# Patient Record
Sex: FEMALE | Race: WHITE | NOT HISPANIC OR LATINO | Employment: OTHER | URBAN - METROPOLITAN AREA
[De-identification: names, ages, dates, MRNs, and addresses within clinical notes are randomized per-mention and may not be internally consistent; named-entity substitution may affect disease eponyms.]

---

## 2017-08-07 ENCOUNTER — CONSULT (OUTPATIENT)
Dept: ONCOLOGY | Facility: CLINIC | Age: 67
End: 2017-08-07

## 2017-08-07 VITALS
BODY MASS INDEX: 29.64 KG/M2 | SYSTOLIC BLOOD PRESSURE: 134 MMHG | DIASTOLIC BLOOD PRESSURE: 73 MMHG | TEMPERATURE: 97.6 F | HEIGHT: 61 IN | WEIGHT: 157 LBS | HEART RATE: 65 BPM | RESPIRATION RATE: 20 BRPM

## 2017-08-07 DIAGNOSIS — R94.31 ABNORMAL ELECTROCARDIOGRAM: ICD-10-CM

## 2017-08-07 DIAGNOSIS — C50.911 MALIGNANT NEOPLASM OF RIGHT FEMALE BREAST, UNSPECIFIED SITE OF BREAST: Primary | ICD-10-CM

## 2017-08-07 PROCEDURE — 99205 OFFICE O/P NEW HI 60 MIN: CPT | Performed by: INTERNAL MEDICINE

## 2017-08-07 RX ORDER — IBUPROFEN 600 MG/1
600 TABLET ORAL EVERY 8 HOURS PRN
COMMUNITY
Start: 2017-06-12 | End: 2017-10-25 | Stop reason: HOSPADM

## 2017-08-07 RX ORDER — ATORVASTATIN CALCIUM 40 MG/1
1 TABLET, FILM COATED ORAL DAILY
COMMUNITY
Start: 2017-07-20 | End: 2017-08-25 | Stop reason: SDUPTHER

## 2017-08-07 RX ORDER — GABAPENTIN 300 MG/1
1 CAPSULE ORAL 2 TIMES DAILY
COMMUNITY
Start: 2017-06-12 | End: 2017-10-20

## 2017-08-07 RX ORDER — AMLODIPINE BESYLATE 5 MG/1
2.5 TABLET ORAL NIGHTLY
COMMUNITY
Start: 2017-06-12 | End: 2018-06-08

## 2017-08-07 RX ORDER — ASPIRIN 81 MG
1 TABLET, DELAYED RELEASE (ENTERIC COATED) ORAL DAILY
COMMUNITY
Start: 2017-07-20 | End: 2017-08-25

## 2017-08-07 NOTE — PROGRESS NOTES
DATE OF CONSULTATION: 8/7/2017    REFERRING PHYSICIAN: Ayad Brandon MD    Dear Dr. Ayad Brandon MD  Thank you for asking for my medical advice on this patient. I saw her in the  [place] office on 8/7/2017    REASON FOR CONSULTATION: High-grade triple negative right breast cancer stage IA [id number]     HISTORY OF PRESENT ILLNESS: The patient is a very pleasant 67 y.o.  female   who was in her usual state of health until approximately 7 months. The patient presented with right breast lump.  She has been very busy taking care of her sick  who passed away May 2017.  She had bilateral mammogram May 12, 2017 that revealed 1.8 cm abnormality in the right breast at 1 o'clock position.  Ultrasound-guided biopsy done on July 5, 2017 showed invasive triple negative high grade ductal carcinoma.  The patient was referred to me for further recommendations.    SUBJECTIVE: When I saw the patient today she is doing fairly well.  She is been through a lot of stress.  She lost 10 pounds over the last month.  Never had this problem before.  Denied any breast tenderness no nipple discharge.    The patient has been pregnant twice she status post 2 C-sections.  She had hysterectomy with bilateral oophorectomy for uterine fibroid.    Review of Systems   Constitutional: Positive for appetite change and unexpected weight change. Negative for activity change, chills, fatigue and fever.   HENT: Negative for hearing loss, mouth sores, nosebleeds, sore throat and trouble swallowing.    Eyes: Negative for visual disturbance.   Respiratory: Negative for cough, chest tightness, shortness of breath and wheezing.    Cardiovascular: Negative for chest pain, palpitations and leg swelling.   Gastrointestinal: Negative for abdominal distention, abdominal pain, blood in stool, constipation, diarrhea, nausea, rectal pain and vomiting.   Endocrine: Negative for cold intolerance and heat intolerance.   Genitourinary: Negative for  "difficulty urinating, dysuria, frequency and urgency.   Musculoskeletal: Negative for arthralgias, back pain, gait problem, joint swelling and myalgias.   Skin: Negative for rash.   Neurological: Negative for dizziness, tremors, syncope, weakness, light-headedness, numbness and headaches.   Hematological: Negative for adenopathy. Does not bruise/bleed easily.   Psychiatric/Behavioral: Negative for confusion, sleep disturbance and suicidal ideas. The patient is nervous/anxious.        Past Medical History:   Diagnosis Date   • Asthma    • Breast cancer    • COPD (chronic obstructive pulmonary disease)    • Diabetes mellitus    • History of angina    • Hypertension        Social History     Social History   • Marital status:      Spouse name: N/A   • Number of children: N/A   • Years of education: N/A     Occupational History   • Not on file.     Social History Main Topics   • Smoking status: Never Smoker   • Smokeless tobacco: Never Used   • Alcohol use No   • Drug use: No   • Sexual activity: Defer     Other Topics Concern   • Not on file     Social History Narrative   • No narrative on file       Family History   Problem Relation Age of Onset   • Rheum arthritis Mother    • No Known Problems Father        Past Surgical History:   Procedure Laterality Date   • COLONOSCOPY  2015   • HYSTERECTOMY         No Known Allergies       Current Outpatient Prescriptions:   •  amLODIPine (NORVASC) 5 MG tablet, Take 1 tablet by mouth Every Night., Disp: , Rfl:   •  ASPIRIN LOW DOSE 81 MG EC tablet, Take 1 tablet by mouth Daily., Disp: , Rfl:   •  atorvastatin (LIPITOR) 40 MG tablet, Take 1 tablet by mouth Daily., Disp: , Rfl:   •  gabapentin (NEURONTIN) 300 MG capsule, Take 1 capsule by mouth Daily. 1-2 tab, Disp: , Rfl:   •  ibuprofen (ADVIL,MOTRIN) 600 MG tablet, As Needed., Disp: , Rfl:     PHYSICAL EXAMINATION:   /73 Comment: LUE  Pulse 65  Temp 97.6 °F (36.4 °C) (Temporal Artery )   Resp 20  Ht 60.5\" (153.7 " cm)  Wt 157 lb (71.2 kg)  BMI 30.16 kg/m2   ECOG Performance Status: 1 - Symptomatic but completely ambulatory  General Appearance:  alert, cooperative, no apparent distress and appears stated age   Neurologic/Psychiatric: A&O x 3, gait steady, appropriate affect, strength 5/5 in all muscle groups   HEENT:  Normocephalic, without obvious abnormality, mucous membranes moist   Neck: Supple, symmetrical, trachea midline, no adenopathy;  No thyromegaly, masses, or tenderness   Lungs:   Clear to auscultation bilaterally; respirations regular, even, and unlabored bilaterally   Heart:  Regular rate and rhythm, no murmurs appreciated   Abdomen:   Soft, non-tender, non-distended and no organomegaly   Lymph nodes: No cervical, supraclavicular, inguinal or axillary adenopathy noted   Extremities: Normal, atraumatic; no clubbing, cyanosis, or edema    Skin: No rashes, ulcers, or suspicious lesions noted       No visits with results within 2 Week(s) from this visit.  Latest known visit with results is:    Hospital Outpatient Visit on 10/06/2015   Component Date Value Ref Range Status   • Ferritin 10/06/2015 161.00  11.1 - 264 ng/mL Final        No results found.      DIAGNOSTIC DATA:   1. Radiology: May 12, 2017 positive mammogram abnormality right breast 1 o'clock position 1.8 cm in size.   2. Dr. Kowalski's note from July 12, 2017 reviewed by me and documented in the  chart.   3. Pathology report: Right breast biopsy July 5, 2017 triple negative high-grade ductal carcinoma  4. Laboratory data: Blood work done October 6, 2015 showed serum ferritin 161     ASSESSMENT: The patient is a very pleasant 67 y.o.  female  with stage I a triple negative right breast cancer    PROBLEM LIST:   1.  Triple negative right breast cancer [id number] stage IA:  A.  Presented with self felt lump right breast  B.  Mammogram done May 23, 2017 showed 1.8 cm nodule 1:00 right breast  C.  Ultrasound-guided biopsy done on July 5, 2017 revealed triple  negative high-grade ductal carcinoma  2.  Hypertension  3.  Hypercholesteremia  4.  Neuropathy    PLAN:   1. I had a long discussion today with the patient and her daughter about her  new diagnosis of breast cancer. I did go over the final pathology report in  detail with both of them. I reviewed the patient's documents including refereing provider's notes, lab results, imaging, and path report.   2.  The patient is scheduled to meet with Dr. LIU later on today to have further discussion about surgical options.  I did recommend lumpectomy with sentinel lymph node biopsy.  I explained to the patient that lumpectomy followed by adjuvant radiation would be as good as mastectomy from cancer recurrence perspective.  3.  I will ask Dr. LIU to insert Port-A-Cath since patient would benefit from adjuvant chemotherapy given her tumor size more than 1 cm with triple negative disease.  I have called Dr. LIU to coordinate patient's care.  4.  I will set the patient up to meet with our Gene counselor since she has high grade triple negative breast cancer.  Patient might benefit from molecular testing.  She does have 2 healthy daughters.  5.  The patient will follow-up with me in 3 weeks to go over the final pathology report as well as arrange for adjuvant chemotherapy, patient will be treated with TC versus TAC versus AC followed by T.  Patient will need to have cardiac echo prior to starting treatment.  America Grissom MD  8/7/2017

## 2017-08-08 ENCOUNTER — DOCUMENTATION (OUTPATIENT)
Dept: OTHER | Facility: HOSPITAL | Age: 67
End: 2017-08-08

## 2017-08-08 NOTE — PROGRESS NOTES
Saw patient with Dr. LIU and patients daughter. Dr. LIU reviewed the pathology report and surgical options. Stage IA palpable mass, IDC of the right breast triple negative- the patient has been seen by Dr. Grissom and will be getting chemptherapy after surgery - MRI has been ordered - patient is hoping for BCT with Radiation -Patient agreed to be consented for the GRAIL study. Arm measurement completed and given to [employer] for EMR. Patient instructed to stop 81 mg of daily ASA in preparation for surgery - Patient also is to establish care with PCP - daughter states that she will take care of that. The patient lives in [place] but will be here for treatment and will be moving here as her  just passed away Spring of 2017. Educational and supportive materials given and reviewed.

## 2017-08-11 ENCOUNTER — CLINICAL SUPPORT (OUTPATIENT)
Dept: GENETICS | Facility: HOSPITAL | Age: 67
End: 2017-08-11

## 2017-08-11 DIAGNOSIS — Z80.1 FAMILY HISTORY OF LUNG CANCER: ICD-10-CM

## 2017-08-11 DIAGNOSIS — C50.911 MALIGNANT NEOPLASM OF RIGHT FEMALE BREAST, UNSPECIFIED SITE OF BREAST: Primary | ICD-10-CM

## 2017-08-14 NOTE — PROGRESS NOTES
Keiry Roman, a 67-year-old female, was referred for genetic counseling due to a personal history of triple negative breast cancer. She was recently diagnosed with triple negative breast cancer at age 67. She had her uterus and ovaries removed in her 40’s. She was seen for genetic counseling on 8/11/17 and was accompanied to the appointment by her daughter, Laine. Ms. Roman was interested in discussing her risk for a hereditary cancer syndrome.    PERTINENT FAMILY HISTORY: (See attached pedigree)   Brother:  Lung cancer, 50s    We do not have medical records to confirm these diagnoses.          RISK ASSESSMENT:  Ms. Roman’s family history is not particularly suspicious for a hereditary cancer syndrome. The cancer diagnoses in the family have been later onset (diagnosed over 50 years of age) greatly reducing the likelihood of a hereditary cancer syndrome. Additionally, there is no reported family history of breast or ovarian cancer.  Based on the family history information available at this time, Ms. Roman did not meet NCCN guidelines criteria for testing for a hereditary cancer syndrome, which indicates testing would not be covered by insurance.  This assessment is based on the information available at the time of the visit, and could change should new information become available.      GENETIC COUNSELING (30 minutes):  We reviewed the family history information in detail.  Cancer is very common; approximately 1 in 3 individuals will develop cancer within a lifetime.  It is not uncommon to see multiple individuals within a family with cancer given the high chance for a person to develop cancer.  The interaction of many factors determines each person’s personal risk, including age, family or personal history of cancer, and external factors such as diet and environmental exposures.  Exactly how these various risk factors interact in an individual is unclear.  Most often, we believe cancer to be a  multifactorial disease caused by an accumulation of genetic alterations acquired over our lifetime, with environmental factors acting in the development of a malignancy.    Cases of breast cancer follow three general patterns: sporadic, familial, and hereditary.  While most types of breast cancer are sporadic, some cases appear to occur in family clusters.  Familial cases may be due to a combination of shared genes and environmental factors among family members.  In even fewer families, the breast cancer is said to be inherited, and the genes responsible for the cancer are known.      The pedigree patterns observed in sporadic versus hereditary cancer families were reviewed.  Family histories typical of hereditary cancer syndromes usually include multiple first- and second-degree relatives diagnosed with cancer types that define a syndrome.  These cases tend to be diagnosed at younger-than-expected ages and can be bilateral or multifocal.  The cancer in these families follows an autosomal dominant inheritance pattern, which indicates the likely presence of a mutation in a cancer susceptibility gene.  Children and siblings of an individual believed to carry this mutation have a 50% chance of inheriting that mutation, thereby inheriting the increased risk to develop cancer.  The National Comprehensive Cancer Network has established guidelines for testing BRCA1/2 and other hereditary cancer syndromes, outlining the criteria for considering genetic testing. An individual diagnosed with triple negative breast cancer must be diagnosed at age 60 or younger to meet NCCN guidelines criteria for genetic testing, in the absence of significant family history. Ms. Roman’s family does not meet criteria for genetic testing, and is at a low risk for a hereditary cancer syndrome based on the information available.     CLINICAL MANAGEMENT GUIDELINES: Ms. Roman’s management and surveillance should be determined by her oncology  team. Based on her family history, her risk for other cancers is not expected to be significantly greater than the general population risk.    Ms. Roman’s close female relatives likely have a somewhat increased lifetime risk for breast cancer based on family history. Given the possible increased risk, options available to individuals with a high lifetime risk for breast cancer were briefly discussed.  Relatives could have individual risk assessments performed to determine their breast cancer risk.  We discussed the specific risk assessment for her daughter who was present at the appointment with Ms. Roman.     Surveillance for individuals with a high lifetime risk of breast cancer (>20% lifetime risk), based on NCCN guidelines, would consist of semi-annual clinical breast exams and monthly self-breast exams starting by age 18 and annual mammography starting 10 years younger than the earliest diagnosis in the family or age 40.  According to an American Cancer Society expert panel and NCCN guidelines, annual breast MRI should be offered to women whose lifetime risk of breast cancer is 20-25 percent or more.  Relatives may have a personalized risk assessment performed to quantify their breast cancer risk.  Breast cancer chemoprevention is another option that female relatives may wish to consider.  Studies have shown that Tamoxifen and Raloxifene can cut the risk of estrogen receptor positive breast cancer by 50% when taken by high-risk women over a 5-year period (not recommended before age 35).  There are risks and side effects associated with these medications; therefore the risks versus benefits must be considered prior to deciding to take chemopreventative medications.  Recommendations could change should new information become available regarding the family history.     PLAN:  Genetic counseling remains available to Ms. Roman.  If information changes regarding Ms. Roman’s personal or family history, we  would be happy to reassess Ms. Roman’s risk or reevaluate the family’s risk for a hereditary cancer syndrome.  Ms. Roman is welcome to contact us with any questions or concerns at [phone].      Bettie Birch MS  Genetic Counselor    Cc:  MD Keiry Loya

## 2017-08-18 ENCOUNTER — HOSPITAL ENCOUNTER (OUTPATIENT)
Dept: MRI IMAGING | Facility: HOSPITAL | Age: 67
Discharge: HOME | End: 2017-08-18
Attending: SURGERY | Admitting: SURGERY

## 2017-08-18 DIAGNOSIS — C50.211 BILATERAL MALIGNANT NEOPLASM OF UPPER INNER QUADRANT OF BREAST IN FEMALE (HCC): ICD-10-CM

## 2017-08-18 DIAGNOSIS — C50.212 BILATERAL MALIGNANT NEOPLASM OF UPPER INNER QUADRANT OF BREAST IN FEMALE (HCC): ICD-10-CM

## 2017-08-18 PROCEDURE — A9577 INJ MULTIHANCE: HCPCS | Performed by: SURGERY

## 2017-08-18 PROCEDURE — 77059 MRI BREAST BILATERAL W WO CONTRAST: CPT | Performed by: RADIOLOGY

## 2017-08-18 PROCEDURE — 0159T PR BREAST MRI, COMPUTER AIDED DETECTION: CPT | Performed by: RADIOLOGY

## 2017-08-18 PROCEDURE — C8908 MRI W/O FOL W/CONT, BREAST,: HCPCS

## 2017-08-18 PROCEDURE — 0159T HC MRI BREAST COMPUTER ANALYSIS: CPT

## 2017-08-18 PROCEDURE — 0 GADOBENATE DIMEGLUMINE 529 MG/ML SOLUTION: Performed by: SURGERY

## 2017-08-18 RX ADMIN — GADOBENATE DIMEGLUMINE 15 ML: 529 INJECTION, SOLUTION INTRAVENOUS at 15:30

## 2017-08-21 ENCOUNTER — TELEPHONE (OUTPATIENT)
Dept: ONCOLOGY | Facility: CLINIC | Age: 67
End: 2017-08-21

## 2017-08-21 NOTE — TELEPHONE ENCOUNTER
----- Message from Lisa Gan sent at 8/21/2017 11:25 AM EDT -----  Regarding: BADIN/QUESTION ABOUT ECHO  Patients daughter Laine called and asked that someone call her back because she has some questions about her moms echo on 08/25/17. Laine can be reached at [phone]

## 2017-08-21 NOTE — TELEPHONE ENCOUNTER
Per Dr Grissom, previous ECHO that had been done is sufficient for treatment. Cancelled ECHO scheduled for 8/25/17

## 2017-08-22 ENCOUNTER — TELEPHONE (OUTPATIENT)
Dept: MRI IMAGING | Facility: HOSPITAL | Age: 67
End: 2017-08-22

## 2017-08-22 ENCOUNTER — TRANSCRIBE ORDERS (OUTPATIENT)
Dept: MAMMOGRAPHY | Facility: HOSPITAL | Age: 67
End: 2017-08-22

## 2017-08-22 DIAGNOSIS — C50.211 MALIGNANT NEOPLASM OF UPPER-INNER QUADRANT OF RIGHT FEMALE BREAST (HCC): Primary | ICD-10-CM

## 2017-08-22 NOTE — TELEPHONE ENCOUNTER
Called pt and talked with pt's daughter, due to the language barrier. Daughter verbalized understanding of results and will convey them to her mother. Surgical consult was recommended.

## 2017-08-25 ENCOUNTER — APPOINTMENT (OUTPATIENT)
Dept: CARDIOLOGY | Facility: HOSPITAL | Age: 67
End: 2017-08-25
Attending: INTERNAL MEDICINE

## 2017-08-25 ENCOUNTER — OFFICE VISIT (OUTPATIENT)
Dept: INTERNAL MEDICINE | Facility: CLINIC | Age: 67
End: 2017-08-25

## 2017-08-25 VITALS
HEART RATE: 63 BPM | DIASTOLIC BLOOD PRESSURE: 82 MMHG | OXYGEN SATURATION: 98 % | WEIGHT: 157 LBS | TEMPERATURE: 97.6 F | HEIGHT: 61 IN | SYSTOLIC BLOOD PRESSURE: 129 MMHG | BODY MASS INDEX: 29.64 KG/M2

## 2017-08-25 DIAGNOSIS — I10 HTN (HYPERTENSION), BENIGN: Primary | ICD-10-CM

## 2017-08-25 DIAGNOSIS — E78.2 MIXED HYPERLIPIDEMIA: ICD-10-CM

## 2017-08-25 DIAGNOSIS — C50.211 MALIGNANT NEOPLASM OF UPPER-INNER QUADRANT OF RIGHT FEMALE BREAST (HCC): ICD-10-CM

## 2017-08-25 DIAGNOSIS — R73.9 HYPERGLYCEMIA: ICD-10-CM

## 2017-08-25 PROCEDURE — 90670 PCV13 VACCINE IM: CPT | Performed by: INTERNAL MEDICINE

## 2017-08-25 PROCEDURE — G0009 ADMIN PNEUMOCOCCAL VACCINE: HCPCS | Performed by: INTERNAL MEDICINE

## 2017-08-25 PROCEDURE — 99203 OFFICE O/P NEW LOW 30 MIN: CPT | Performed by: INTERNAL MEDICINE

## 2017-08-25 RX ORDER — ATORVASTATIN CALCIUM 40 MG/1
40 TABLET, FILM COATED ORAL DAILY
Qty: 30 TABLET | Refills: 11 | Status: SHIPPED | OUTPATIENT
Start: 2017-08-25

## 2017-08-25 RX ORDER — PROMETHAZINE HYDROCHLORIDE AND CODEINE PHOSPHATE 6.25; 1 MG/5ML; MG/5ML
5 SYRUP ORAL EVERY 6 HOURS PRN
Qty: 118 ML | Refills: 0 | Status: SHIPPED | OUTPATIENT
Start: 2017-08-25 | End: 2017-10-04

## 2017-08-25 NOTE — PROGRESS NOTES
Subjective  Keiry Roman is a 67 y.o. female    HPI coming in to establish care here she is accompanied by her daughter was giving us some of the history patient with a history of hypertension and hyperlipidemia with a recent diagnosis of right breast cancer which appears to be localized. Patient is due for possible lumpectomy followed by chemotherapy.. Currently complains of some fatigue with dyspnea on exertion. She denies chest pain or palpitation. Was a resident in [place] some of the workup done in the past will be brought in by her daughter    The following portions of the patient's history were reviewed and updated as appropriate: allergies, current medications, past family history, past medical history, past social history, past surgical history, and problem list.     Review of Systems   Constitutional: Positive for fatigue. Negative for activity change, appetite change and fever.   HENT: Positive for congestion, rhinorrhea, sinus pressure and sneezing. Negative for ear discharge, ear pain and trouble swallowing.    Eyes: Negative for photophobia and visual disturbance.   Respiratory: Positive for shortness of breath. Negative for cough.    Cardiovascular: Negative for chest pain and palpitations.   Gastrointestinal: Negative for abdominal distention, abdominal pain, constipation, diarrhea, nausea and vomiting.   Endocrine: Negative.    Genitourinary: Positive for frequency and urgency. Negative for dysuria and hematuria.        Stress incontinence   Musculoskeletal: Positive for arthralgias. Negative for back pain, joint swelling and myalgias.   Skin: Negative for color change and rash.   Allergic/Immunologic: Negative.    Neurological: Negative for dizziness, weakness, light-headedness and headaches.   Hematological: Negative for adenopathy. Does not bruise/bleed easily.   Psychiatric/Behavioral: Positive for dysphoric mood and sleep disturbance. Negative for agitation and confusion. The patient is not  nervous/anxious.        Vitals:    08/25/17 1425   BP: 129/82   Pulse: 63   Temp: 97.6 °F (36.4 °C)   SpO2: 98%       Objective  Physical Exam   Constitutional: She is oriented to person, place, and time. She appears well-developed and well-nourished. No distress.   HENT:   Nose: Nose normal.   Mouth/Throat: Oropharynx is clear and moist.   Eyes: Conjunctivae and EOM are normal. No scleral icterus.   Neck: No tracheal deviation present. No thyromegaly present.   Cardiovascular: Normal rate and regular rhythm.  Exam reveals no friction rub.    No murmur heard.  Pulmonary/Chest: No respiratory distress. She has no wheezes. She has no rales.   Abdominal: Soft. She exhibits no distension and no mass. There is no tenderness. There is no guarding.   Musculoskeletal: Normal range of motion. She exhibits no deformity.   Lymphadenopathy:     She has no cervical adenopathy.   Neurological: She is alert and oriented to person, place, and time. She has normal reflexes. No cranial nerve deficit. Coordination normal.   Skin: Skin is warm and dry. No rash noted. No erythema.   Psychiatric: She has a normal mood and affect. Her behavior is normal. Judgment and thought content normal.       Diagnoses and all orders for this visit:    HTN (hypertension), benign stable with current meds and low-salt diet    Mixed hyperlipidemia continue with the statins follow lipid profile     Malignant neoplasm of upper-inner quadrant of right female breast records reviewed schedule for surgery. Is following up with oncology    Other orders  -     atorvastatin (LIPITOR) 40 MG tablet; Take 1 tablet by mouth Daily.

## 2017-08-28 ENCOUNTER — APPOINTMENT (OUTPATIENT)
Dept: PREADMISSION TESTING | Facility: HOSPITAL | Age: 67
End: 2017-08-28

## 2017-08-28 VITALS — BODY MASS INDEX: 28.6 KG/M2 | HEIGHT: 62 IN | WEIGHT: 155.42 LBS

## 2017-08-28 DIAGNOSIS — E78.2 MIXED HYPERLIPIDEMIA: ICD-10-CM

## 2017-08-28 DIAGNOSIS — K75.9 HEPATITIS: Primary | ICD-10-CM

## 2017-08-28 DIAGNOSIS — R73.9 BLOOD GLUCOSE ELEVATED: Primary | ICD-10-CM

## 2017-08-28 LAB
ALBUMIN SERPL-MCNC: 4.6 G/DL (ref 3.2–4.8)
ALBUMIN/GLOB SERPL: 1.6 G/DL (ref 1.5–2.5)
ALP SERPL-CCNC: 84 U/L (ref 25–100)
ALT SERPL W P-5'-P-CCNC: 77 U/L (ref 7–40)
ANION GAP SERPL CALCULATED.3IONS-SCNC: 6 MMOL/L (ref 3–11)
ARTICHOKE IGE QN: 85 MG/DL (ref 0–130)
AST SERPL-CCNC: 60 U/L (ref 0–33)
BILIRUB SERPL-MCNC: 1.2 MG/DL (ref 0.3–1.2)
BUN BLD-MCNC: 15 MG/DL (ref 9–23)
BUN/CREAT SERPL: 25 (ref 7–25)
CALCIUM SPEC-SCNC: 9.7 MG/DL (ref 8.7–10.4)
CHLORIDE SERPL-SCNC: 106 MMOL/L (ref 99–109)
CHOLEST SERPL-MCNC: 166 MG/DL (ref 0–200)
CO2 SERPL-SCNC: 28 MMOL/L (ref 20–31)
CREAT BLD-MCNC: 0.6 MG/DL (ref 0.6–1.3)
DEPRECATED RDW RBC AUTO: 46.1 FL (ref 37–54)
ERYTHROCYTE [DISTWIDTH] IN BLOOD BY AUTOMATED COUNT: 13.5 % (ref 11.3–14.5)
GFR SERPL CREATININE-BSD FRML MDRD: 99 ML/MIN/1.73
GLOBULIN UR ELPH-MCNC: 2.8 GM/DL
GLUCOSE BLD-MCNC: 106 MG/DL (ref 70–100)
HBA1C MFR BLD: 5.8 % (ref 4.8–5.6)
HCT VFR BLD AUTO: 42 % (ref 34.5–44)
HDLC SERPL-MCNC: 57 MG/DL (ref 40–60)
HGB BLD-MCNC: 14 G/DL (ref 11.5–15.5)
MCH RBC QN AUTO: 30.8 PG (ref 27–31)
MCHC RBC AUTO-ENTMCNC: 33.3 G/DL (ref 32–36)
MCV RBC AUTO: 92.5 FL (ref 80–99)
PLATELET # BLD AUTO: 189 10*3/MM3 (ref 150–450)
PMV BLD AUTO: 9.5 FL (ref 6–12)
POTASSIUM BLD-SCNC: 4.2 MMOL/L (ref 3.5–5.5)
PROT SERPL-MCNC: 7.4 G/DL (ref 5.7–8.2)
RBC # BLD AUTO: 4.54 10*6/MM3 (ref 3.89–5.14)
SODIUM BLD-SCNC: 140 MMOL/L (ref 132–146)
TRIGL SERPL-MCNC: 156 MG/DL (ref 0–150)
WBC NRBC COR # BLD: 5.91 10*3/MM3 (ref 3.5–10.8)

## 2017-08-28 PROCEDURE — 93005 ELECTROCARDIOGRAM TRACING: CPT

## 2017-08-28 PROCEDURE — 80061 LIPID PANEL: CPT | Performed by: INTERNAL MEDICINE

## 2017-08-28 PROCEDURE — 83036 HEMOGLOBIN GLYCOSYLATED A1C: CPT | Performed by: INTERNAL MEDICINE

## 2017-08-28 PROCEDURE — 93010 ELECTROCARDIOGRAM REPORT: CPT | Performed by: INTERNAL MEDICINE

## 2017-08-28 PROCEDURE — 80053 COMPREHEN METABOLIC PANEL: CPT | Performed by: SURGERY

## 2017-08-28 PROCEDURE — 85027 COMPLETE CBC AUTOMATED: CPT | Performed by: SURGERY

## 2017-08-28 PROCEDURE — 36415 COLL VENOUS BLD VENIPUNCTURE: CPT

## 2017-08-28 RX ORDER — CETIRIZINE HYDROCHLORIDE 10 MG/1
10 TABLET ORAL DAILY
COMMUNITY

## 2017-08-29 ENCOUNTER — ANESTHESIA EVENT (OUTPATIENT)
Dept: PERIOP | Facility: HOSPITAL | Age: 67
End: 2017-08-29

## 2017-08-29 ENCOUNTER — HOSPITAL ENCOUNTER (OUTPATIENT)
Dept: ULTRASOUND IMAGING | Facility: HOSPITAL | Age: 67
Discharge: HOME | End: 2017-08-29

## 2017-08-29 ENCOUNTER — HOSPITAL ENCOUNTER (OUTPATIENT)
Dept: MAMMOGRAPHY | Facility: HOSPITAL | Age: 67
Discharge: HOME | End: 2017-08-29

## 2017-08-29 ENCOUNTER — HOSPITAL ENCOUNTER (OUTPATIENT)
Dept: MAMMOGRAPHY | Facility: HOSPITAL | Age: 67
Discharge: HOME | End: 2017-08-29
Attending: SURGERY

## 2017-08-29 ENCOUNTER — ANESTHESIA (OUTPATIENT)
Dept: PERIOP | Facility: HOSPITAL | Age: 67
End: 2017-08-29

## 2017-08-29 ENCOUNTER — HOSPITAL ENCOUNTER (OUTPATIENT)
Dept: NUCLEAR MEDICINE | Facility: HOSPITAL | Age: 67
Discharge: HOME | End: 2017-08-29

## 2017-08-29 ENCOUNTER — HOSPITAL ENCOUNTER (OUTPATIENT)
Facility: HOSPITAL | Age: 67
Setting detail: HOSPITAL OUTPATIENT SURGERY
Discharge: HOME | End: 2017-08-29
Attending: SURGERY | Admitting: SURGERY

## 2017-08-29 VITALS
RESPIRATION RATE: 18 BRPM | HEART RATE: 70 BPM | SYSTOLIC BLOOD PRESSURE: 131 MMHG | TEMPERATURE: 97.5 F | DIASTOLIC BLOOD PRESSURE: 84 MMHG | OXYGEN SATURATION: 93 %

## 2017-08-29 DIAGNOSIS — R92.8 ABNORMAL MAMMOGRAM: ICD-10-CM

## 2017-08-29 DIAGNOSIS — C50.211 MALIGNANT NEOPLASM OF UPPER-INNER QUADRANT OF RIGHT FEMALE BREAST (HCC): ICD-10-CM

## 2017-08-29 DIAGNOSIS — C50.911: ICD-10-CM

## 2017-08-29 DIAGNOSIS — C50.919 BREAST CANCER (HCC): ICD-10-CM

## 2017-08-29 PROCEDURE — 25010000002 ONDANSETRON PER 1 MG: Performed by: NURSE ANESTHETIST, CERTIFIED REGISTERED

## 2017-08-29 PROCEDURE — A9541 TC99M SULFUR COLLOID: HCPCS | Performed by: SURGERY

## 2017-08-29 PROCEDURE — 25010000002 DEXAMETHASONE PER 1 MG: Performed by: NURSE ANESTHETIST, CERTIFIED REGISTERED

## 2017-08-29 PROCEDURE — 76098 X-RAY EXAM SURGICAL SPECIMEN: CPT

## 2017-08-29 PROCEDURE — 76098 X-RAY EXAM SURGICAL SPECIMEN: CPT | Performed by: RADIOLOGY

## 2017-08-29 PROCEDURE — 25010000002 PROPOFOL 10 MG/ML EMULSION: Performed by: NURSE ANESTHETIST, CERTIFIED REGISTERED

## 2017-08-29 PROCEDURE — 25010000002 FENTANYL CITRATE (PF) 100 MCG/2ML SOLUTION: Performed by: NURSE ANESTHETIST, CERTIFIED REGISTERED

## 2017-08-29 PROCEDURE — 19285 PERQ DEV BREAST 1ST US IMAG: CPT | Performed by: RADIOLOGY

## 2017-08-29 PROCEDURE — 25010000002 PROMETHAZINE PER 50 MG: Performed by: NURSE ANESTHETIST, CERTIFIED REGISTERED

## 2017-08-29 PROCEDURE — 0 TECHNETIUM FILTERED SULFUR COLLOID: Performed by: SURGERY

## 2017-08-29 PROCEDURE — 38792 RA TRACER ID OF SENTINL NODE: CPT

## 2017-08-29 PROCEDURE — G0206 DX MAMMO INCL CAD UNI: HCPCS | Performed by: RADIOLOGY

## 2017-08-29 PROCEDURE — 25010000003 CEFAZOLIN IN DEXTROSE 2-4 GM/100ML-% SOLUTION: Performed by: SURGERY

## 2017-08-29 PROCEDURE — 88331 PATH CONSLTJ SURG 1 BLK 1SPC: CPT | Performed by: SURGERY

## 2017-08-29 PROCEDURE — 88307 TISSUE EXAM BY PATHOLOGIST: CPT | Performed by: SURGERY

## 2017-08-29 RX ORDER — FENTANYL CITRATE 50 UG/ML
50 INJECTION, SOLUTION INTRAMUSCULAR; INTRAVENOUS
Status: DISCONTINUED | OUTPATIENT
Start: 2017-08-29 | End: 2017-08-29 | Stop reason: HOSPADM

## 2017-08-29 RX ORDER — MAGNESIUM HYDROXIDE 1200 MG/15ML
LIQUID ORAL AS NEEDED
Status: DISCONTINUED | OUTPATIENT
Start: 2017-08-29 | End: 2017-08-29 | Stop reason: HOSPADM

## 2017-08-29 RX ORDER — DEXAMETHASONE SODIUM PHOSPHATE 4 MG/ML
INJECTION, SOLUTION INTRA-ARTICULAR; INTRALESIONAL; INTRAMUSCULAR; INTRAVENOUS; SOFT TISSUE AS NEEDED
Status: DISCONTINUED | OUTPATIENT
Start: 2017-08-29 | End: 2017-08-29 | Stop reason: SURG

## 2017-08-29 RX ORDER — PROMETHAZINE HYDROCHLORIDE 25 MG/1
25 TABLET ORAL ONCE AS NEEDED
Status: COMPLETED | OUTPATIENT
Start: 2017-08-29 | End: 2017-08-29

## 2017-08-29 RX ORDER — LIDOCAINE HYDROCHLORIDE 10 MG/ML
INJECTION, SOLUTION EPIDURAL; INFILTRATION; INTRACAUDAL; PERINEURAL AS NEEDED
Status: DISCONTINUED | OUTPATIENT
Start: 2017-08-29 | End: 2017-08-29 | Stop reason: SURG

## 2017-08-29 RX ORDER — SODIUM CHLORIDE, SODIUM LACTATE, POTASSIUM CHLORIDE, CALCIUM CHLORIDE 600; 310; 30; 20 MG/100ML; MG/100ML; MG/100ML; MG/100ML
9 INJECTION, SOLUTION INTRAVENOUS CONTINUOUS
Status: DISCONTINUED | OUTPATIENT
Start: 2017-08-29 | End: 2017-08-29 | Stop reason: HOSPADM

## 2017-08-29 RX ORDER — CEFAZOLIN SODIUM 2 G/100ML
2 INJECTION, SOLUTION INTRAVENOUS ONCE
Status: COMPLETED | OUTPATIENT
Start: 2017-08-29 | End: 2017-08-29

## 2017-08-29 RX ORDER — FAMOTIDINE 10 MG/ML
20 INJECTION, SOLUTION INTRAVENOUS ONCE
Status: CANCELLED | OUTPATIENT
Start: 2017-08-29 | End: 2017-08-29

## 2017-08-29 RX ORDER — PROPOFOL 10 MG/ML
VIAL (ML) INTRAVENOUS AS NEEDED
Status: DISCONTINUED | OUTPATIENT
Start: 2017-08-29 | End: 2017-08-29 | Stop reason: SURG

## 2017-08-29 RX ORDER — ONDANSETRON 2 MG/ML
INJECTION INTRAMUSCULAR; INTRAVENOUS AS NEEDED
Status: DISCONTINUED | OUTPATIENT
Start: 2017-08-29 | End: 2017-08-29 | Stop reason: SURG

## 2017-08-29 RX ORDER — PROMETHAZINE HYDROCHLORIDE 25 MG/ML
6.25 INJECTION, SOLUTION INTRAMUSCULAR; INTRAVENOUS ONCE AS NEEDED
Status: COMPLETED | OUTPATIENT
Start: 2017-08-29 | End: 2017-08-29

## 2017-08-29 RX ORDER — HYDROMORPHONE HYDROCHLORIDE 1 MG/ML
0.5 INJECTION, SOLUTION INTRAMUSCULAR; INTRAVENOUS; SUBCUTANEOUS
Status: DISCONTINUED | OUTPATIENT
Start: 2017-08-29 | End: 2017-08-29 | Stop reason: HOSPADM

## 2017-08-29 RX ORDER — FAMOTIDINE 20 MG/1
20 TABLET, FILM COATED ORAL ONCE
Status: COMPLETED | OUTPATIENT
Start: 2017-08-29 | End: 2017-08-29

## 2017-08-29 RX ORDER — FENTANYL CITRATE 50 UG/ML
INJECTION, SOLUTION INTRAMUSCULAR; INTRAVENOUS AS NEEDED
Status: DISCONTINUED | OUTPATIENT
Start: 2017-08-29 | End: 2017-08-29 | Stop reason: SURG

## 2017-08-29 RX ORDER — SODIUM CHLORIDE 0.9 % (FLUSH) 0.9 %
1-10 SYRINGE (ML) INJECTION AS NEEDED
Status: DISCONTINUED | OUTPATIENT
Start: 2017-08-29 | End: 2017-08-29 | Stop reason: HOSPADM

## 2017-08-29 RX ORDER — BUPIVACAINE HYDROCHLORIDE 5 MG/ML
INJECTION, SOLUTION EPIDURAL; INTRACAUDAL AS NEEDED
Status: DISCONTINUED | OUTPATIENT
Start: 2017-08-29 | End: 2017-08-29 | Stop reason: HOSPADM

## 2017-08-29 RX ORDER — PROMETHAZINE HYDROCHLORIDE 25 MG/1
25 SUPPOSITORY RECTAL ONCE AS NEEDED
Status: COMPLETED | OUTPATIENT
Start: 2017-08-29 | End: 2017-08-29

## 2017-08-29 RX ORDER — LIDOCAINE HYDROCHLORIDE 10 MG/ML
5 INJECTION, SOLUTION INFILTRATION; PERINEURAL ONCE
Status: DISCONTINUED | OUTPATIENT
Start: 2017-08-29 | End: 2017-08-29 | Stop reason: HOSPADM

## 2017-08-29 RX ORDER — MEPERIDINE HYDROCHLORIDE 25 MG/ML
12.5 INJECTION INTRAMUSCULAR; INTRAVENOUS; SUBCUTANEOUS
Status: DISCONTINUED | OUTPATIENT
Start: 2017-08-29 | End: 2017-08-29 | Stop reason: HOSPADM

## 2017-08-29 RX ORDER — LIDOCAINE HYDROCHLORIDE 10 MG/ML
0.5 INJECTION, SOLUTION EPIDURAL; INFILTRATION; INTRACAUDAL; PERINEURAL ONCE AS NEEDED
Status: COMPLETED | OUTPATIENT
Start: 2017-08-29 | End: 2017-08-29

## 2017-08-29 RX ADMIN — LIDOCAINE HYDROCHLORIDE 50 MG: 10 INJECTION, SOLUTION EPIDURAL; INFILTRATION; INTRACAUDAL; PERINEURAL at 12:10

## 2017-08-29 RX ADMIN — PROMETHAZINE HYDROCHLORIDE 6.25 MG: 25 INJECTION INTRAMUSCULAR; INTRAVENOUS at 14:03

## 2017-08-29 RX ADMIN — ONDANSETRON 4 MG: 2 INJECTION INTRAMUSCULAR; INTRAVENOUS at 12:48

## 2017-08-29 RX ADMIN — LIDOCAINE HYDROCHLORIDE 5 ML: 10 INJECTION, SOLUTION INFILTRATION; PERINEURAL at 10:37

## 2017-08-29 RX ADMIN — METHYLENE BLUE 10 MG: 10 INJECTION INTRAVENOUS at 10:38

## 2017-08-29 RX ADMIN — FAMOTIDINE 20 MG: 20 TABLET, FILM COATED ORAL at 10:49

## 2017-08-29 RX ADMIN — FENTANYL CITRATE 50 MCG: 50 INJECTION, SOLUTION INTRAMUSCULAR; INTRAVENOUS at 12:10

## 2017-08-29 RX ADMIN — FENTANYL CITRATE 50 MCG: 50 INJECTION, SOLUTION INTRAMUSCULAR; INTRAVENOUS at 12:50

## 2017-08-29 RX ADMIN — LIDOCAINE HYDROCHLORIDE 0.5 ML: 10 INJECTION, SOLUTION EPIDURAL; INFILTRATION; INTRACAUDAL; PERINEURAL at 10:40

## 2017-08-29 RX ADMIN — SODIUM CHLORIDE, POTASSIUM CHLORIDE, SODIUM LACTATE AND CALCIUM CHLORIDE 9 ML/HR: 600; 310; 30; 20 INJECTION, SOLUTION INTRAVENOUS at 10:40

## 2017-08-29 RX ADMIN — DEXAMETHASONE SODIUM PHOSPHATE 8 MG: 4 INJECTION, SOLUTION INTRAMUSCULAR; INTRAVENOUS at 12:10

## 2017-08-29 RX ADMIN — CEFAZOLIN SODIUM 2 G: 2 INJECTION, SOLUTION INTRAVENOUS at 12:06

## 2017-08-29 RX ADMIN — PROPOFOL 400 MG: 10 INJECTION, EMULSION INTRAVENOUS at 12:10

## 2017-08-29 RX ADMIN — TECHNETIUM TC 99M SULFUR COLLOID 1 DOSE: KIT at 12:10

## 2017-08-29 NOTE — ANESTHESIA POSTPROCEDURE EVALUATION
Patient: Keiry Roman    Procedure Summary     Date Anesthesia Start Anesthesia Stop Room / Location    08/29/17 1206 1315 [facility] / [facility]       Procedure Diagnosis Surgeon Provider    RIGHT BREAST LUMPECTOMY WITH NEEDLE LOCALIZATION RIGHT SENTINEL NODE BIOPSY  (Right Breast) Breast cancer of upper-inner quadrant of right female breast MD Lenard Krause MD          Anesthesia Type: general  Last vitals  BP   123/86 (08/29/17 1315)    Temp   97.6 °F (36.4 °C) (08/29/17 1315)    Pulse   85 (08/29/17 1315)   Resp   16 (08/29/17 1315)    SpO2   96 % (08/29/17 1315)      Post Anesthesia Care and Evaluation    Patient location during evaluation: PACU  Patient participation: complete - patient participated  Level of consciousness: awake and alert  Pain score: 0  Pain management: adequate  Airway patency: patent  Anesthetic complications: No anesthetic complications  PONV Status: none  Cardiovascular status: hemodynamically stable and acceptable  Respiratory status: nonlabored ventilation, acceptable and nasal cannula  Hydration status: acceptable

## 2017-08-29 NOTE — ANESTHESIA PROCEDURE NOTES
Airway  Urgency: elective    Date/Time: 8/29/2017 12:11 PM  End Time:8/29/2017 12:11 PM  Airway not difficult    General Information and Staff    Patient location during procedure: OR  CRNA: SKYLA GARDUNO    Indications and Patient Condition  Indications for airway management: airway protection    Preoxygenated: yes  Mask difficulty assessment: 1 - vent by mask    Final Airway Details  Final airway type: supraglottic airway      Successful airway: classic  Size 4    Number of attempts at approach: 1    Additional Comments  LMA placed without difficulty, ventilation with assist, equal breath sounds and symmetric chest rise and fall

## 2017-08-29 NOTE — ANESTHESIA PREPROCEDURE EVALUATION
Anesthesia Evaluation     Patient summary reviewed and Nursing notes reviewed          Airway   Mallampati: II  TM distance: >3 FB  Neck ROM: full  no difficulty expected  Dental - normal exam     Pulmonary - normal exam   (+) asthma, sleep apnea (no tx),   Cardiovascular - normal exam    (+) hypertension, hyperlipidemia      Neuro/Psych- negative ROS  GI/Hepatic/Renal/Endo    (+)  diabetes mellitus (no tx),     Musculoskeletal (-) negative ROS    Abdominal  - normal exam    Bowel sounds: normal.   Substance History - negative use     OB/GYN negative ob/gyn ROS         Other      history of cancer (breast)                                    Anesthesia Plan    ASA 3     general     intravenous induction   Anesthetic plan and risks discussed with patient.    Plan discussed with CRNA.

## 2017-08-30 ENCOUNTER — RESULTS ENCOUNTER (OUTPATIENT)
Dept: INTERNAL MEDICINE | Facility: CLINIC | Age: 67
End: 2017-08-30

## 2017-08-30 DIAGNOSIS — E78.2 MIXED HYPERLIPIDEMIA: ICD-10-CM

## 2017-09-01 ENCOUNTER — LAB (OUTPATIENT)
Dept: LAB | Facility: HOSPITAL | Age: 67
End: 2017-09-01

## 2017-09-01 ENCOUNTER — OFFICE VISIT (OUTPATIENT)
Dept: ONCOLOGY | Facility: CLINIC | Age: 67
End: 2017-09-01

## 2017-09-01 VITALS
WEIGHT: 154 LBS | SYSTOLIC BLOOD PRESSURE: 127 MMHG | DIASTOLIC BLOOD PRESSURE: 71 MMHG | HEIGHT: 62 IN | RESPIRATION RATE: 18 BRPM | TEMPERATURE: 97.4 F | HEART RATE: 75 BPM | BODY MASS INDEX: 28.34 KG/M2

## 2017-09-01 DIAGNOSIS — C50.211 BREAST CANCER OF UPPER-INNER QUADRANT OF RIGHT FEMALE BREAST (HCC): ICD-10-CM

## 2017-09-01 DIAGNOSIS — C50.211 BREAST CANCER OF UPPER-INNER QUADRANT OF RIGHT FEMALE BREAST (HCC): Primary | ICD-10-CM

## 2017-09-01 LAB
ALBUMIN SERPL-MCNC: 4.6 G/DL (ref 3.2–4.8)
ALBUMIN/GLOB SERPL: 1.5 G/DL (ref 1.5–2.5)
ALP SERPL-CCNC: 90 U/L (ref 25–100)
ALT SERPL W P-5'-P-CCNC: 51 U/L (ref 7–40)
ANION GAP SERPL CALCULATED.3IONS-SCNC: 7 MMOL/L (ref 3–11)
AST SERPL-CCNC: 38 U/L (ref 0–33)
BASOPHILS # BLD AUTO: 0.09 10*3/MM3 (ref 0–0.2)
BASOPHILS NFR BLD AUTO: 1.3 % (ref 0–1)
BILIRUB SERPL-MCNC: 0.8 MG/DL (ref 0.3–1.2)
BUN BLD-MCNC: 16 MG/DL (ref 9–23)
BUN/CREAT SERPL: 17.8 (ref 7–25)
CALCIUM SPEC-SCNC: 10 MG/DL (ref 8.7–10.4)
CHLORIDE SERPL-SCNC: 104 MMOL/L (ref 99–109)
CO2 SERPL-SCNC: 30 MMOL/L (ref 20–31)
CREAT BLD-MCNC: 0.9 MG/DL (ref 0.6–1.3)
CYTO UR: NORMAL
DEPRECATED RDW RBC AUTO: 45.7 FL (ref 37–54)
EOSINOPHIL # BLD AUTO: 0.12 10*3/MM3 (ref 0–0.3)
EOSINOPHIL NFR BLD AUTO: 1.7 % (ref 0–3)
ERYTHROCYTE [DISTWIDTH] IN BLOOD BY AUTOMATED COUNT: 13.5 % (ref 11.3–14.5)
GFR SERPL CREATININE-BSD FRML MDRD: 62 ML/MIN/1.73
GLOBULIN UR ELPH-MCNC: 3.1 GM/DL
GLUCOSE BLD-MCNC: 102 MG/DL (ref 70–100)
HCT VFR BLD AUTO: 41.9 % (ref 34.5–44)
HGB BLD-MCNC: 13.8 G/DL (ref 11.5–15.5)
IMM GRANULOCYTES # BLD: 0.03 10*3/MM3 (ref 0–0.03)
IMM GRANULOCYTES NFR BLD: 0.4 % (ref 0–0.6)
LAB AP CASE REPORT: NORMAL
LAB AP CLINICAL INFORMATION: NORMAL
LYMPHOCYTES # BLD AUTO: 1.47 10*3/MM3 (ref 0.6–4.8)
LYMPHOCYTES NFR BLD AUTO: 21 % (ref 24–44)
Lab: NORMAL
Lab: NORMAL
MCH RBC QN AUTO: 30.5 PG (ref 27–31)
MCHC RBC AUTO-ENTMCNC: 32.9 G/DL (ref 32–36)
MCV RBC AUTO: 92.7 FL (ref 80–99)
MONOCYTES # BLD AUTO: 0.56 10*3/MM3 (ref 0–1)
MONOCYTES NFR BLD AUTO: 8 % (ref 0–12)
NEUTROPHILS # BLD AUTO: 4.73 10*3/MM3 (ref 1.5–8.3)
NEUTROPHILS NFR BLD AUTO: 67.6 % (ref 41–71)
PATH REPORT.FINAL DX SPEC: NORMAL
PATH REPORT.GROSS SPEC: NORMAL
PLAT MORPH BLD: NORMAL
PLATELET # BLD AUTO: 220 10*3/MM3 (ref 150–450)
PMV BLD AUTO: 9.6 FL (ref 6–12)
POTASSIUM BLD-SCNC: 4.4 MMOL/L (ref 3.5–5.5)
PROT SERPL-MCNC: 7.7 G/DL (ref 5.7–8.2)
RBC # BLD AUTO: 4.52 10*6/MM3 (ref 3.89–5.14)
RBC MORPH BLD: NORMAL
SODIUM BLD-SCNC: 141 MMOL/L (ref 132–146)
WBC MORPH BLD: NORMAL
WBC NRBC COR # BLD: 7 10*3/MM3 (ref 3.5–10.8)

## 2017-09-01 PROCEDURE — 36415 COLL VENOUS BLD VENIPUNCTURE: CPT | Performed by: INTERNAL MEDICINE

## 2017-09-01 PROCEDURE — 80074 ACUTE HEPATITIS PANEL: CPT | Performed by: INTERNAL MEDICINE

## 2017-09-01 PROCEDURE — 80053 COMPREHEN METABOLIC PANEL: CPT | Performed by: INTERNAL MEDICINE

## 2017-09-01 PROCEDURE — 86704 HEP B CORE ANTIBODY TOTAL: CPT | Performed by: INTERNAL MEDICINE

## 2017-09-01 PROCEDURE — 99215 OFFICE O/P EST HI 40 MIN: CPT | Performed by: INTERNAL MEDICINE

## 2017-09-01 PROCEDURE — 85025 COMPLETE CBC W/AUTO DIFF WBC: CPT | Performed by: INTERNAL MEDICINE

## 2017-09-01 PROCEDURE — 85007 BL SMEAR W/DIFF WBC COUNT: CPT | Performed by: INTERNAL MEDICINE

## 2017-09-01 PROCEDURE — 86707 HEPATITIS BE ANTIBODY: CPT | Performed by: INTERNAL MEDICINE

## 2017-09-01 PROCEDURE — 87350 HEPATITIS BE AG IA: CPT | Performed by: INTERNAL MEDICINE

## 2017-09-01 PROCEDURE — 86706 HEP B SURFACE ANTIBODY: CPT | Performed by: INTERNAL MEDICINE

## 2017-09-01 RX ORDER — TEMAZEPAM 15 MG/1
15 CAPSULE ORAL NIGHTLY PRN
Qty: 30 CAPSULE | Refills: 2 | OUTPATIENT
Start: 2017-09-01 | End: 2017-10-25 | Stop reason: HOSPADM

## 2017-09-01 RX ORDER — ONDANSETRON HYDROCHLORIDE 8 MG/1
8 TABLET, FILM COATED ORAL 3 TIMES DAILY PRN
Qty: 30 TABLET | Refills: 5 | Status: SHIPPED | OUTPATIENT
Start: 2017-09-01 | End: 2018-06-08

## 2017-09-01 RX ORDER — DEXAMETHASONE 4 MG/1
TABLET ORAL
Qty: 10 TABLET | Refills: 3 | Status: SHIPPED | OUTPATIENT
Start: 2017-09-01 | End: 2018-06-08

## 2017-09-01 RX ORDER — TRAMADOL HYDROCHLORIDE 50 MG/1
50 TABLET ORAL EVERY 6 HOURS PRN
COMMUNITY
End: 2018-06-08

## 2017-09-01 NOTE — PROGRESS NOTES
DATE OF VISIT: 9/1/2017    REASON FOR VISIT: Followup for triple negative right breast cancer [id number].      HISTORY OF PRESENT ILLNESS: The patient is a very pleasant 67 y.o. female  with past medical history significant for breast cancer diagnosed 7/5/2017. The patient presented with right breast lump.  She has been very busy taking care of her sick  who passed away May 2017.  She had bilateral mammogram May 12, 2017 that revealed 1.8 cm abnormality in the right breast at 1 o'clock position.  Ultrasound-guided biopsy done on July 5, 2017 showed invasive triple negative high grade ductal carcinoma. The patient had right breast lumpectomy with sentinel lymph node biopsy done 8/29/2017 with final pathology showing 1.7 cm high-grade ductal carcinoma, clear surgical margins, negative sentinel lymph nodes, no lymphovascular invasion, and triple negative disease. The patient is here today for scheduled follow up.      SUBJECTIVE: The patient has been doing fairly well. she was able to tolerate  her surgery without immediate complications. she denied any fever or  chills, no night sweats, denied any headaches.  She has been complaining of mild abdominal pain.  She had a blood work done with her primary provider that revealed elevated liver enzymes.    PAST MEDICAL HISTORY/SOCIAL HISTORY/FAMILY HISTORY: Unchanged from my prior documentation done on 08/07/2017.     Review of Systems   Constitutional: Negative for activity change, appetite change, chills, fatigue, fever and unexpected weight change.   HENT: Negative for hearing loss, mouth sores, nosebleeds, sore throat and trouble swallowing.    Eyes: Negative for visual disturbance.   Respiratory: Negative for cough, chest tightness, shortness of breath and wheezing.    Cardiovascular: Negative for chest pain, palpitations and leg swelling.   Gastrointestinal: Negative for abdominal distention, abdominal pain, blood in stool, constipation, diarrhea, nausea, rectal  pain and vomiting.   Endocrine: Negative for cold intolerance and heat intolerance.   Genitourinary: Negative for difficulty urinating, dysuria, frequency and urgency.   Musculoskeletal: Negative for arthralgias, back pain, gait problem, joint swelling and myalgias.   Skin: Negative for rash.   Neurological: Negative for dizziness, tremors, syncope, weakness, light-headedness, numbness and headaches.   Hematological: Negative for adenopathy. Does not bruise/bleed easily.   Psychiatric/Behavioral: Negative for confusion, sleep disturbance and suicidal ideas. The patient is not nervous/anxious.          Current Outpatient Prescriptions:   •  traMADol (ULTRAM) 50 MG tablet, Take 50 mg by mouth Every 6 (Six) Hours As Needed for Moderate Pain ., Disp: , Rfl:   •  amLODIPine (NORVASC) 5 MG tablet, Take 1 tablet by mouth Every Night., Disp: , Rfl:   •  atorvastatin (LIPITOR) 40 MG tablet, Take 1 tablet by mouth Daily., Disp: 30 tablet, Rfl: 11  •  cetirizine (zyrTEC) 10 MG tablet, Take 10 mg by mouth Daily., Disp: , Rfl:   •  dexamethasone (DECADRON) 4 MG tablet, Take 2 tablets in the morning the day after chemo (Day 2), then take 2 tablets twice daily on days 3 & 4.  Take with food., Disp: 10 tablet, Rfl: 3  •  fluticasone (VERAMYST) 27.5 MCG/SPRAY nasal spray, 2 sprays into each nostril Daily., Disp: , Rfl:   •  gabapentin (NEURONTIN) 300 MG capsule, Take 1 capsule by mouth 2 (Two) Times a Day. 1-2 tab, Disp: , Rfl:   •  ibuprofen (ADVIL,MOTRIN) 600 MG tablet, Take 600 mg by mouth Every 8 (Eight) Hours As Needed., Disp: , Rfl:   •  ondansetron (ZOFRAN) 8 MG tablet, Take 1 tablet by mouth 3 (Three) Times a Day As Needed for Nausea or Vomiting., Disp: 30 tablet, Rfl: 5  •  promethazine-codeine (PHENERGAN with CODEINE) 6.25-10 MG/5ML syrup, Take 5 mL by mouth Every 6 (Six) Hours As Needed for Cough., Disp: 118 mL, Rfl: 0  •  temazepam (RESTORIL) 15 MG capsule, Take 1 capsule by mouth At Night As Needed for Sleep., Disp: 30  "capsule, Rfl: 2    PHYSICAL EXAMINATION:   /71 Comment: LUE  Pulse 75  Temp 97.4 °F (36.3 °C) (Temporal Artery )   Resp 18  Ht 61.5\" (156.2 cm)  Wt 154 lb (69.9 kg)  BMI 28.63 kg/m2   ECOG Performance Status: 1 - Symptomatic but completely ambulatory  General Appearance:  alert, cooperative, no apparent distress and appears stated age   Neurologic/Psychiatric: A&O x 3, gait steady, appropriate affect, strength 5/5 in all muscle groups   HEENT:  Normocephalic, without obvious abnormality, mucous membranes moist   Neck: Supple, symmetrical, trachea midline, no adenopathy;  No thyromegaly, masses, or tenderness   Lungs:   Clear to auscultation bilaterally; respirations regular, even, and unlabored bilaterally   Heart:  Regular rate and rhythm, no murmurs appreciated   Abdomen:   Soft, non-tender, non-distended and no organomegaly   Lymph nodes: No cervical, supraclavicular, inguinal or axillary adenopathy noted   Extremities: Normal, atraumatic; no clubbing, cyanosis, or edema    Skin: No rashes, ulcers, or suspicious lesions noted     Admission on 08/29/2017, Discharged on 08/29/2017   Component Date Value Ref Range Status   • Case Report 08/29/2017    Final                    Value:Surgical Pathology Report                         Case: [id number]                                  Authorizing Provider:  Bree Elizondo MD    Collected:           08/29/2017 12:39 PM          Ordering Location:     [facility]   Received:            08/29/2017 01:02 PM                                 OR                                                                           Pathologist:           Ayad Gamez MD                                                            Intraop:               Edy Pantoja MD                                                         Specimens:   1) - Breast, Right, right breast lumpectomy - wire lateral, long anterior, short                    inferior                 "                                                                            2) - Breast, Right, right breast sentinel node                                            • Clinical Information 08/29/2017    Final                    Value:This result contains rich text formatting which cannot be displayed here.   • Final Diagnosis 08/29/2017    Final                    Value:This result contains rich text formatting which cannot be displayed here.   • Intraoperative Consultation 08/29/2017    Final                    Value:This result contains rich text formatting which cannot be displayed here.   • Gross Description 08/29/2017    Final                    Value:This result contains rich text formatting which cannot be displayed here.   • Microscopic Description 08/29/2017    Final                    Value:This result contains rich text formatting which cannot be displayed here.   Appointment on 08/28/2017   Component Date Value Ref Range Status   • WBC 08/28/2017 5.91  3.50 - 10.80 10*3/mm3 Final   • RBC 08/28/2017 4.54  3.89 - 5.14 10*6/mm3 Final   • Hemoglobin 08/28/2017 14.0  11.5 - 15.5 g/dL Final   • Hematocrit 08/28/2017 42.0  34.5 - 44.0 % Final   • MCV 08/28/2017 92.5  80.0 - 99.0 fL Final   • MCH 08/28/2017 30.8  27.0 - 31.0 pg Final   • MCHC 08/28/2017 33.3  32.0 - 36.0 g/dL Final   • RDW 08/28/2017 13.5  11.3 - 14.5 % Final   • RDW-SD 08/28/2017 46.1  37.0 - 54.0 fl Final   • MPV 08/28/2017 9.5  6.0 - 12.0 fL Final   • Platelets 08/28/2017 189  150 - 450 10*3/mm3 Final   • Glucose 08/28/2017 106* 70 - 100 mg/dL Final   • BUN 08/28/2017 15  9 - 23 mg/dL Final   • Creatinine 08/28/2017 0.60  0.60 - 1.30 mg/dL Final   • Sodium 08/28/2017 140  132 - 146 mmol/L Final   • Potassium 08/28/2017 4.2  3.5 - 5.5 mmol/L Final   • Chloride 08/28/2017 106  99 - 109 mmol/L Final   • CO2 08/28/2017 28.0  20.0 - 31.0 mmol/L Final   • Calcium 08/28/2017 9.7  8.7 - 10.4 mg/dL Final   • Total Protein 08/28/2017 7.4  5.7 - 8.2 g/dL  Final   • Albumin 08/28/2017 4.60  3.20 - 4.80 g/dL Final   • ALT (SGPT) 08/28/2017 77* 7 - 40 U/L Final   • AST (SGOT) 08/28/2017 60* 0 - 33 U/L Final   • Alkaline Phosphatase 08/28/2017 84  25 - 100 U/L Final   • Total Bilirubin 08/28/2017 1.2  0.3 - 1.2 mg/dL Final   • eGFR Non African Amer 08/28/2017 99  >60 mL/min/1.73 Final   • Globulin 08/28/2017 2.8  gm/dL Final   • A/G Ratio 08/28/2017 1.6  1.5 - 2.5 g/dL Final   • BUN/Creatinine Ratio 08/28/2017 25.0  7.0 - 25.0 Final   • Anion Gap 08/28/2017 6.0  3.0 - 11.0 mmol/L Final   • Total Cholesterol 08/28/2017 166  0 - 200 mg/dL Final   • Triglycerides 08/28/2017 156* 0 - 150 mg/dL Final   • HDL Cholesterol 08/28/2017 57  40 - 60 mg/dL Final   • LDL Cholesterol  08/28/2017 85  0 - 130 mg/dL Final   • Hemoglobin A1C 08/28/2017 5.80* 4.80 - 5.60 % Final        Mri Breast Bilateral With & Without Contrast    Result Date: 8/21/2017  Narrative: BILATERAL BREAST MRI WITH CONTRAST-08/18/2017:  CLINICAL HISTORY:  67-year-old patient who presents for preoperative breast MRI. She was recently diagnosed with a high-grade invasive ductal (no special type) carcinoma  involving her right breast.  TECHNIQUE:  Pre-contrast spin-echo T1 weighted and T2 sequences were obtained in the axial plane.  Routine dynamic images were performed following the administration of 14.0 ml of MultiHance contrast.  No contrast complications occurred.  Delayed high resolution post contrast T1 weighted sagittal images were also obtained.   A CAD system ([employer]) was utilized for data analysis.  COMPARISON:  Comparison is made to recent outside imaging studies dated 05/12/2017, 05/10/2017, 04/10/2015, 01/04/2012 and 10/01/2010.  FINDINGS: There is mild-to-moderate background contrast enhancement of the fibroglandular tissue. There are scattered foci of contrast enhancement bilaterally. The index malignancy corresponds to an oval isointense mass with ill-defined borders. This mass is located in the  posterior upper inner quadrant of the right breast and seen on images 35-47 of the postcontrast series. The mass is hyperintense on the STIR sequences. There is surrounding vascularity particularly between the mass and the medial skin. The mass actually causes a protrusion of the skin medially. There does not appear to be any abnormal enhancement within the skin. The mass demonstrates an initial rapid uptake of contrast with a delayed washout phase. The contrast enhancement is very heterogeneous and there appears to be necrotic areas within the mass, particularly along the inferior aspect of the mass. The mass measures approximately 2.2 cm (anterior to posterior) x 2.0 cm (medial to lateral) x 2.5 cm (superior to inferior) cm in size. The mass is located approximately 1 cm anterior to the pectoralis muscle. There does not appear to be any abnormal enhancement of the pectoralis muscle. There are no other concerning masses or non- masslike areas of enhancement on either side. There is no evidence of axillary adenopathy. No chest wall abnormalities are identified.      Impression: Index malignancy is seen in the posterior right upper inner quadrant and measures up to 2.5 cm from superior to inferior. There is a lot of surrounding vascularity and portions of the mass  appears to be necrotic based on the postcontrast images as well as the appearance of the mass on the STIR sequences. There is no evidence of multifocal or multicentric disease.  BI-RADS CATEGORY: 6, KNOWN BIOPSY PROVEN MALIGNANCY.  RECOMMENDATION:  Recommend the patient followup with Dr. Elizondo for surgical planning.  This report was finalized on 8/21/2017 3:49 PM by Dr. Laine Braden MD.      Mammo Outside Films    Result Date: 8/8/2017  Narrative: This procedure was auto-finalized with no dictation required.    Mammo Outside Films    Result Date: 8/8/2017  Narrative: This procedure was auto-finalized with no dictation required.    Mammo Outside  Films    Result Date: 8/8/2017  Narrative: This procedure was auto-finalized with no dictation required.    Mammo Outside Films    Result Date: 8/8/2017  Narrative: This procedure was auto-finalized with no dictation required.    Mammo Outside Films    Result Date: 8/8/2017  Narrative: This procedure was auto-finalized with no dictation required.    Mammo Outside Films    Result Date: 8/8/2017  Narrative: This procedure was auto-finalized with no dictation required.    Nm [place] Node Injection Only    Result Date: 8/30/2017  Narrative: EXAMINATION: NM SENTINEL NODE INJECTION ONLY-08/29/2017:  INDICATION: Right sentinel node biopsy; C50.911-Malignant neoplasm of unspecified site of right female breast, sentinel node, right breast cancer.  TECHNIQUE: 500 microcuries of Technetium 99m filtered Sulfur Colloid was injected into the right breast.  COMPARISON: NONE.  FINDINGS: Pharmaceutical for injection into the right breast for sentinel node mapping and lymphoscintigraphy for right breast carcinoma. Please see the procedure report for full details. The dictation is for documentation of pharmaceutical usage only.      Impression: Pharmaceutical for injection into the right breast for sentinel node mapping and lymphoscintigraphy for a right breast carcinoma.  D:  08/30/2017 E:  08/30/2017     This report was finalized on 8/30/2017 3:37 PM by Dr. Kika Ramon MD.        ASSESSMENT: The patient is a very pleasant 67 y.o. female  with stage I a triple negative right breast cancer.     PROBLEM LIST:  1.  Triple negative right breast cancer [id number] stage IA:  A.  Presented with self felt lump right breast  B.  Mammogram done May 23, 2017 showed 1.8 cm nodule 1:00 right breast  C.  Ultrasound-guided biopsy done on July 5, 2017 revealed triple negative high-grade ductal carcinoma  D. Status post right breast lumpectomy with sentinel node biopsy completed 8/29/2017.   E. Final pathology   2.  Hypertension  3.   Hypercholesteremia  4.  Neuropathy  5.  Insomnia  6.  Elevated liver enzymes with abdominal pain    PLAN:  1. I reviewed the final pathology results in detail with the patient and her daughter.   2.  The patient would benefit from adjuvant chemotherapy given the fact that she has high-grade disease with triple negative receptors.  3.  I would recommend to do Taxotere Cytoxan every 3 weeks for 4 doses.  We'll add Neulasta for each dose this is in compliance with NCCN guidelines.  4. We discussed the side effects of this regimen including alopecia, nausea, fatigue, myelosuppression, infusion reaction, neuropathy, diarrhea, and potential death.  5.  I will monitor patient blood work that she is in treatment including blood counts kidney function and liver enzymes.  6.  I will give the patient couple of weeks continued to recover from her surgery before starting treatment.  7.  Patient benefit from adjuvant radiation after completion of chemotherapy.  8.  Patient need to have bilateral mammogram follow-up 6 months after completion of radiation.  9.  I will do a CAT scan chest and pelvis for her new symptoms with elevated liver enzymes.  10.  I will start patient on Restoril 15 mg daily at bedtime as needed for insomnia.    Scribed for America Grissom MD by Gertrude Jain, DIEGO. 9/1/2017  3:57 PM  America Grissom MD  9/1/2017   I, America Grissom MD, personally performed the services described in this documentation as scribed by the above named individual in my presence, and it is both accurate and complete.  9/1/2017  3:57 PM

## 2017-09-02 ENCOUNTER — RESULTS ENCOUNTER (OUTPATIENT)
Dept: INTERNAL MEDICINE | Facility: CLINIC | Age: 67
End: 2017-09-02

## 2017-09-02 DIAGNOSIS — K75.9 HEPATITIS: ICD-10-CM

## 2017-09-06 LAB
HBV CORE AB SER DONR QL IA: POSITIVE
HBV CORE IGM SERPL QL IA: NEGATIVE
HBV E AB SERPL QL IA: NEGATIVE
HBV E AG SERPL QL IA: NEGATIVE
HBV SURFACE AB SER QL: REACTIVE
HBV SURFACE AG SERPL QL IA: NEGATIVE
HCV AB S/CO SERPL IA: <0.1 S/CO RATIO (ref 0–0.9)
LABORATORY COMMENT REPORT: NORMAL

## 2017-09-07 ENCOUNTER — HOSPITAL ENCOUNTER (OUTPATIENT)
Dept: CT IMAGING | Facility: HOSPITAL | Age: 67
Discharge: HOME | End: 2017-09-07
Attending: INTERNAL MEDICINE | Admitting: INTERNAL MEDICINE

## 2017-09-07 ENCOUNTER — DOCUMENTATION (OUTPATIENT)
Dept: ONCOLOGY | Facility: CLINIC | Age: 67
End: 2017-09-07

## 2017-09-07 DIAGNOSIS — C50.211 BREAST CANCER OF UPPER-INNER QUADRANT OF RIGHT FEMALE BREAST (HCC): ICD-10-CM

## 2017-09-07 PROCEDURE — 74177 CT ABD & PELVIS W/CONTRAST: CPT

## 2017-09-07 PROCEDURE — 0 IOPAMIDOL 61 % SOLUTION: Performed by: INTERNAL MEDICINE

## 2017-09-07 PROCEDURE — 71260 CT THORAX DX C+: CPT

## 2017-09-07 RX ADMIN — IOPAMIDOL 95 ML: 612 INJECTION, SOLUTION INTRAVENOUS at 09:40

## 2017-09-12 DIAGNOSIS — C50.211 BREAST CANCER OF UPPER-INNER QUADRANT OF RIGHT FEMALE BREAST (HCC): ICD-10-CM

## 2017-09-13 ENCOUNTER — OFFICE VISIT (OUTPATIENT)
Dept: ONCOLOGY | Facility: CLINIC | Age: 67
End: 2017-09-13

## 2017-09-13 ENCOUNTER — INFUSION (OUTPATIENT)
Dept: ONCOLOGY | Facility: HOSPITAL | Age: 67
End: 2017-09-13

## 2017-09-13 ENCOUNTER — TELEPHONE (OUTPATIENT)
Dept: ONCOLOGY | Facility: HOSPITAL | Age: 67
End: 2017-09-13

## 2017-09-13 VITALS
DIASTOLIC BLOOD PRESSURE: 67 MMHG | SYSTOLIC BLOOD PRESSURE: 126 MMHG | TEMPERATURE: 97.2 F | RESPIRATION RATE: 18 BRPM | WEIGHT: 155 LBS | BODY MASS INDEX: 28.81 KG/M2 | HEART RATE: 63 BPM

## 2017-09-13 DIAGNOSIS — C50.211 BREAST CANCER OF UPPER-INNER QUADRANT OF RIGHT FEMALE BREAST (HCC): Primary | ICD-10-CM

## 2017-09-13 LAB
ALBUMIN SERPL-MCNC: 4.7 G/DL (ref 3.5–5)
ALBUMIN/GLOB SERPL: 1.4 G/DL (ref 1–2)
ALP SERPL-CCNC: 67 U/L (ref 38–126)
ALT SERPL W P-5'-P-CCNC: 40 U/L (ref 13–69)
ANION GAP SERPL CALCULATED.3IONS-SCNC: 14.7 MMOL/L
AST SERPL-CCNC: 35 U/L (ref 15–46)
BASOPHILS # BLD AUTO: 0.03 10*3/MM3 (ref 0–0.2)
BASOPHILS NFR BLD AUTO: 0.8 % (ref 0–2.5)
BILIRUB SERPL-MCNC: 1.1 MG/DL (ref 0.2–1.3)
BUN BLD-MCNC: 16 MG/DL (ref 7–20)
BUN/CREAT SERPL: 20 (ref 7.1–23.5)
CALCIUM SPEC-SCNC: 9.8 MG/DL (ref 8.4–10.2)
CHLORIDE SERPL-SCNC: 107 MMOL/L (ref 98–107)
CO2 SERPL-SCNC: 27 MMOL/L (ref 26–30)
CREAT BLD-MCNC: 0.8 MG/DL (ref 0.6–1.3)
DEPRECATED RDW RBC AUTO: 44.7 FL (ref 37–54)
EOSINOPHIL # BLD AUTO: 0.09 10*3/MM3 (ref 0–0.7)
EOSINOPHIL NFR BLD AUTO: 2.3 % (ref 0–7)
ERYTHROCYTE [DISTWIDTH] IN BLOOD BY AUTOMATED COUNT: 13.1 % (ref 11.5–14.5)
GFR SERPL CREATININE-BSD FRML MDRD: 71 ML/MIN/1.73
GLOBULIN UR ELPH-MCNC: 3.3 GM/DL
GLUCOSE BLD-MCNC: 96 MG/DL (ref 74–98)
HCT VFR BLD AUTO: 40.9 % (ref 37–47)
HGB BLD-MCNC: 13.5 G/DL (ref 12–16)
IMM GRANULOCYTES # BLD: 0.01 10*3/MM3 (ref 0–0.06)
IMM GRANULOCYTES NFR BLD: 0.3 % (ref 0–0.6)
LYMPHOCYTES # BLD AUTO: 1.09 10*3/MM3 (ref 0.6–3.4)
LYMPHOCYTES NFR BLD AUTO: 27.9 % (ref 10–50)
MCH RBC QN AUTO: 30.5 PG (ref 27–31)
MCHC RBC AUTO-ENTMCNC: 33 G/DL (ref 30–37)
MCV RBC AUTO: 92.3 FL (ref 81–99)
MONOCYTES # BLD AUTO: 0.36 10*3/MM3 (ref 0–0.9)
MONOCYTES NFR BLD AUTO: 9.2 % (ref 0–12)
NEUTROPHILS # BLD AUTO: 2.33 10*3/MM3 (ref 2–6.9)
NEUTROPHILS NFR BLD AUTO: 59.5 % (ref 37–80)
NRBC BLD MANUAL-RTO: 0 /100 WBC (ref 0–0)
PLATELET # BLD AUTO: 207 10*3/MM3 (ref 130–400)
PMV BLD AUTO: 9.4 FL (ref 6–12)
POTASSIUM BLD-SCNC: 3.7 MMOL/L (ref 3.5–5.1)
PROT SERPL-MCNC: 8 G/DL (ref 6.3–8.2)
RBC # BLD AUTO: 4.43 10*6/MM3 (ref 4.2–5.4)
SODIUM BLD-SCNC: 145 MMOL/L (ref 137–145)
WBC NRBC COR # BLD: 3.91 10*3/MM3 (ref 4.8–10.8)

## 2017-09-13 PROCEDURE — 25010000002 PALONOSETRON PER 25 MCG: Performed by: INTERNAL MEDICINE

## 2017-09-13 PROCEDURE — 96374 THER/PROPH/DIAG INJ IV PUSH: CPT

## 2017-09-13 PROCEDURE — 96377 APPLICATON ON-BODY INJECTOR: CPT

## 2017-09-13 PROCEDURE — 25010000002 DOCETAXEL 20 MG/ML CONCENTRATION 4 ML VIAL: Performed by: INTERNAL MEDICINE

## 2017-09-13 PROCEDURE — 96413 CHEMO IV INFUSION 1 HR: CPT

## 2017-09-13 PROCEDURE — 96375 TX/PRO/DX INJ NEW DRUG ADDON: CPT

## 2017-09-13 PROCEDURE — 80053 COMPREHEN METABOLIC PANEL: CPT

## 2017-09-13 PROCEDURE — 99215 OFFICE O/P EST HI 40 MIN: CPT | Performed by: INTERNAL MEDICINE

## 2017-09-13 PROCEDURE — 25010000002 PEGFILGRASTIM 6 MG/0.6ML PREFILLED SYRINGE KIT: Performed by: INTERNAL MEDICINE

## 2017-09-13 PROCEDURE — 25010000002 CYCLOPHOSPHAMIDE PER 100 MG: Performed by: INTERNAL MEDICINE

## 2017-09-13 PROCEDURE — 96417 CHEMO IV INFUS EACH ADDL SEQ: CPT

## 2017-09-13 PROCEDURE — 36415 COLL VENOUS BLD VENIPUNCTURE: CPT

## 2017-09-13 PROCEDURE — 25010000002 DOCETAXEL 20 MG/ML CONCENTRATION 1 ML VIAL: Performed by: INTERNAL MEDICINE

## 2017-09-13 PROCEDURE — 85025 COMPLETE CBC W/AUTO DIFF WBC: CPT

## 2017-09-13 PROCEDURE — 25010000002 LORAZEPAM PER 2 MG: Performed by: INTERNAL MEDICINE

## 2017-09-13 RX ORDER — PALONOSETRON 0.05 MG/ML
0.25 INJECTION, SOLUTION INTRAVENOUS ONCE
Status: CANCELLED | OUTPATIENT
Start: 2017-10-25

## 2017-09-13 RX ORDER — SODIUM CHLORIDE 9 MG/ML
250 INJECTION, SOLUTION INTRAVENOUS ONCE
Status: CANCELLED | OUTPATIENT
Start: 2017-10-04

## 2017-09-13 RX ORDER — LORAZEPAM 2 MG/ML
1 INJECTION INTRAMUSCULAR ONCE
Status: CANCELLED
Start: 2017-09-13 | End: 2017-09-13

## 2017-09-13 RX ORDER — PALONOSETRON 0.05 MG/ML
0.25 INJECTION, SOLUTION INTRAVENOUS ONCE
Status: CANCELLED | OUTPATIENT
Start: 2017-10-04

## 2017-09-13 RX ORDER — LORAZEPAM 2 MG/ML
1 INJECTION INTRAMUSCULAR ONCE
Status: CANCELLED
Start: 2017-11-15 | End: 2017-11-15

## 2017-09-13 RX ORDER — SODIUM CHLORIDE 9 MG/ML
250 INJECTION, SOLUTION INTRAVENOUS ONCE
Status: CANCELLED | OUTPATIENT
Start: 2017-11-15

## 2017-09-13 RX ORDER — PALONOSETRON 0.05 MG/ML
0.25 INJECTION, SOLUTION INTRAVENOUS ONCE
Status: COMPLETED | OUTPATIENT
Start: 2017-09-13 | End: 2017-09-13

## 2017-09-13 RX ORDER — PALONOSETRON 0.05 MG/ML
0.25 INJECTION, SOLUTION INTRAVENOUS ONCE
Status: CANCELLED | OUTPATIENT
Start: 2017-11-15

## 2017-09-13 RX ORDER — LORAZEPAM 2 MG/ML
1 INJECTION INTRAMUSCULAR ONCE
Status: CANCELLED
Start: 2017-10-04 | End: 2017-10-04

## 2017-09-13 RX ORDER — DEXAMETHASONE 4 MG/1
TABLET ORAL
Qty: 30 TABLET | Refills: 3 | Status: SHIPPED | OUTPATIENT
Start: 2017-09-13 | End: 2017-10-20 | Stop reason: SDUPTHER

## 2017-09-13 RX ORDER — SODIUM CHLORIDE 9 MG/ML
250 INJECTION, SOLUTION INTRAVENOUS ONCE
Status: CANCELLED | OUTPATIENT
Start: 2017-10-25

## 2017-09-13 RX ORDER — PALONOSETRON 0.05 MG/ML
0.25 INJECTION, SOLUTION INTRAVENOUS ONCE
Status: CANCELLED | OUTPATIENT
Start: 2017-09-13

## 2017-09-13 RX ORDER — ONDANSETRON HYDROCHLORIDE 8 MG/1
8 TABLET, FILM COATED ORAL 3 TIMES DAILY PRN
Qty: 30 TABLET | Refills: 5 | Status: SHIPPED | OUTPATIENT
Start: 2017-09-13 | End: 2017-10-20 | Stop reason: SDUPTHER

## 2017-09-13 RX ORDER — LORAZEPAM 2 MG/ML
1 INJECTION INTRAMUSCULAR ONCE
Status: CANCELLED
Start: 2017-10-25 | End: 2017-10-25

## 2017-09-13 RX ORDER — SODIUM CHLORIDE 9 MG/ML
250 INJECTION, SOLUTION INTRAVENOUS ONCE
Status: CANCELLED | OUTPATIENT
Start: 2017-09-13

## 2017-09-13 RX ORDER — LORAZEPAM 2 MG/ML
1 INJECTION INTRAMUSCULAR ONCE
Status: COMPLETED | OUTPATIENT
Start: 2017-09-13 | End: 2017-09-13

## 2017-09-13 RX ORDER — SODIUM CHLORIDE 9 MG/ML
250 INJECTION, SOLUTION INTRAVENOUS ONCE
Status: DISCONTINUED | OUTPATIENT
Start: 2017-09-13 | End: 2017-09-13 | Stop reason: HOSPADM

## 2017-09-13 RX ADMIN — CYCLOPHOSPHAMIDE 1030 MG: 500 INJECTION, POWDER, FOR SOLUTION INTRAVENOUS; ORAL at 12:48

## 2017-09-13 RX ADMIN — PALONOSETRON HYDROCHLORIDE 0.25 MG: 0.25 INJECTION INTRAVENOUS at 11:21

## 2017-09-13 RX ADMIN — DOCETAXEL 130 MG: 20 INJECTION, SOLUTION INTRAVENOUS at 11:38

## 2017-09-13 RX ADMIN — PEGFILGRASTIM 6 MG: KIT SUBCUTANEOUS at 13:23

## 2017-09-13 RX ADMIN — LORAZEPAM 1 MG: 2 INJECTION INTRAMUSCULAR; INTRAVENOUS at 11:23

## 2017-09-13 NOTE — TELEPHONE ENCOUNTER
Called patient to discuss chemotherapy regimen (Cyclophosphamide + Docetaxel). Daughter answered phone, reports patient actively receiving treatment. Request information be relayed to daughter regarding chemo education. Dicussed supportive care medications zofran 8mg PO every 8 hours PRN N/V and Dex 8mg PO BID starting the day before chemo. Reports initially unaware, however new script has been processed with updated instructions. Patient aware for next cycle. Advised of common side effects including but not limited to: myelosupression, hemorraghic cystitis, alopecia, N/V, stomatitis, rash, neuropahy, and edema. Answered all questions. Reports printed handout provide in infusion room. Aware to call with any additional questions or concerns.     ----- Message from Kika Campos RN sent at 9/13/2017 10:48 AM EDT -----  Regarding: TC education  Please call patient with education on TC. She is a hernandez patient getting her first dose today, and was not given formal education. Thanks!

## 2017-09-13 NOTE — PROGRESS NOTES
DATE OF VISIT: 9/13/2017    REASON FOR VISIT: Followup for triple negative right breast cancer [id number].      HISTORY OF PRESENT ILLNESS: The patient is a very pleasant 67 y.o. female  with past medical history significant for breast cancer diagnosed 7/5/2017. The patient presented with right breast lump.  She has been very busy taking care of her sick  who passed away May 2017.  She had bilateral mammogram May 12, 2017 that revealed 1.8 cm abnormality in the right breast at 1 o'clock position.  Ultrasound-guided biopsy done on July 5, 2017 showed invasive triple negative high grade ductal carcinoma. The patient had right breast lumpectomy with sentinel lymph node biopsy done 8/29/2017 with final pathology showing 1.7 cm high-grade ductal carcinoma, clear surgical margins, negative sentinel lymph nodes, no lymphovascular invasion, and triple negative disease.  She was started on adjuvant chemotherapy using Taxotere Cytoxan September 13, 2017.  The patient is here today for chemotherapy cycle #1 antibody over the results.      SUBJECTIVE: The patient has been doing fairly well. she was able to tolerate  her surgery without immediate complications. she denied any fever or  chills, no night sweats, denied any headaches.  She is anxious about starting chemotherapy.    PAST MEDICAL HISTORY/SOCIAL HISTORY/FAMILY HISTORY: Unchanged from my prior documentation done on 08/07/2017.     Review of Systems   Constitutional: Negative for activity change, appetite change, chills, fatigue, fever and unexpected weight change.   HENT: Negative for hearing loss, mouth sores, nosebleeds, sore throat and trouble swallowing.    Eyes: Negative for visual disturbance.   Respiratory: Negative for cough, chest tightness, shortness of breath and wheezing.    Cardiovascular: Negative for chest pain, palpitations and leg swelling.   Gastrointestinal: Negative for abdominal distention, abdominal pain, blood in stool, constipation,  diarrhea, nausea, rectal pain and vomiting.   Endocrine: Negative for cold intolerance and heat intolerance.   Genitourinary: Negative for difficulty urinating, dysuria, frequency and urgency.   Musculoskeletal: Negative for arthralgias, back pain, gait problem, joint swelling and myalgias.   Skin: Negative for rash.   Neurological: Negative for dizziness, tremors, syncope, weakness, light-headedness, numbness and headaches.   Hematological: Negative for adenopathy. Does not bruise/bleed easily.   Psychiatric/Behavioral: Negative for confusion, sleep disturbance and suicidal ideas. The patient is not nervous/anxious.          Current Outpatient Prescriptions:   •  amLODIPine (NORVASC) 5 MG tablet, Take 1 tablet by mouth Every Night., Disp: , Rfl:   •  atorvastatin (LIPITOR) 40 MG tablet, Take 1 tablet by mouth Daily., Disp: 30 tablet, Rfl: 11  •  cetirizine (zyrTEC) 10 MG tablet, Take 10 mg by mouth Daily., Disp: , Rfl:   •  dexamethasone (DECADRON) 4 MG tablet, Take 2 tablets in the morning the day after chemo (Day 2), then take 2 tablets twice daily on days 3 & 4.  Take with food., Disp: 10 tablet, Rfl: 3  •  fluocinonide-emollient (LIDEX-E) 0.05 % cream, Apply  topically 2 (Two) Times a Day., Disp: 15 g, Rfl: 0  •  fluticasone (VERAMYST) 27.5 MCG/SPRAY nasal spray, 2 sprays into each nostril Daily., Disp: , Rfl:   •  gabapentin (NEURONTIN) 300 MG capsule, Take 1 capsule by mouth 2 (Two) Times a Day. 1-2 tab, Disp: , Rfl:   •  ibuprofen (ADVIL,MOTRIN) 600 MG tablet, Take 600 mg by mouth Every 8 (Eight) Hours As Needed., Disp: , Rfl:   •  ondansetron (ZOFRAN) 8 MG tablet, Take 1 tablet by mouth 3 (Three) Times a Day As Needed for Nausea or Vomiting., Disp: 30 tablet, Rfl: 5  •  promethazine-codeine (PHENERGAN with CODEINE) 6.25-10 MG/5ML syrup, Take 5 mL by mouth Every 6 (Six) Hours As Needed for Cough., Disp: 118 mL, Rfl: 0  •  temazepam (RESTORIL) 15 MG capsule, Take 1 capsule by mouth At Night As Needed for  Sleep., Disp: 30 capsule, Rfl: 2  •  traMADol (ULTRAM) 50 MG tablet, Take 50 mg by mouth Every 6 (Six) Hours As Needed for Moderate Pain ., Disp: , Rfl:     PHYSICAL EXAMINATION:   /67  Pulse 63  Temp 97.2 °F (36.2 °C) (Temporal Artery )   Resp 18  Wt 155 lb (70.3 kg)  BMI 28.81 kg/m2   ECOG Performance Status: 1 - Symptomatic but completely ambulatory  General Appearance:  alert, cooperative, no apparent distress and appears stated age   Neurologic/Psychiatric: A&O x 3, gait steady, appropriate affect, strength 5/5 in all muscle groups   HEENT:  Normocephalic, without obvious abnormality, mucous membranes moist   Neck: Supple, symmetrical, trachea midline, no adenopathy;  No thyromegaly, masses, or tenderness   Lungs:   Clear to auscultation bilaterally; respirations regular, even, and unlabored bilaterally   Heart:  Regular rate and rhythm, no murmurs appreciated   Abdomen:   Soft, non-tender, non-distended and no organomegaly   Lymph nodes: No cervical, supraclavicular, inguinal or axillary adenopathy noted   Extremities: Normal, atraumatic; no clubbing, cyanosis, or edema    Skin: No rashes, ulcers, or suspicious lesions noted     Results Encounter on 09/02/2017   Component Date Value Ref Range Status   • Hepatitis B Surface Ag 09/01/2017 Negative  Negative Final   • Hep B E Ag 09/01/2017 Negative  Negative Final   • Hep B Core IgM 09/01/2017 Negative  Negative Final   • Hep B Core Total Ab 09/01/2017 Positive* Negative Final   • Hep B E Ab 09/01/2017 Negative  Negative Final   • Hep B S Ab 09/01/2017 Reactive   Final                  Non Reactive: Inconsistent with immunity,                              less than 10 mIU/mL                Reactive:     Consistent with immunity,                              greater than 9.9 mIU/mL   • Hepatitis C Ab 09/01/2017 <0.1  0.0 - 0.9 s/co ratio Final   • Comment 09/01/2017 Comment   Final    Non reactive HCV antibody screen is consistent with no HCV  infection,  unless recent infection is suspected or other evidence exists to  indicate HCV infection.   Lab on 09/01/2017   Component Date Value Ref Range Status   • Glucose 09/01/2017 102* 70 - 100 mg/dL Final   • BUN 09/01/2017 16  9 - 23 mg/dL Final   • Creatinine 09/01/2017 0.90  0.60 - 1.30 mg/dL Final   • Sodium 09/01/2017 141  132 - 146 mmol/L Final   • Potassium 09/01/2017 4.4  3.5 - 5.5 mmol/L Final   • Chloride 09/01/2017 104  99 - 109 mmol/L Final   • CO2 09/01/2017 30.0  20.0 - 31.0 mmol/L Final   • Calcium 09/01/2017 10.0  8.7 - 10.4 mg/dL Final   • Total Protein 09/01/2017 7.7  5.7 - 8.2 g/dL Final   • Albumin 09/01/2017 4.60  3.20 - 4.80 g/dL Final   • ALT (SGPT) 09/01/2017 51* 7 - 40 U/L Final   • AST (SGOT) 09/01/2017 38* 0 - 33 U/L Final   • Alkaline Phosphatase 09/01/2017 90  25 - 100 U/L Final   • Total Bilirubin 09/01/2017 0.8  0.3 - 1.2 mg/dL Final   • eGFR Non African Amer 09/01/2017 62  >60 mL/min/1.73 Final   • Globulin 09/01/2017 3.1  gm/dL Final   • A/G Ratio 09/01/2017 1.5  1.5 - 2.5 g/dL Final   • BUN/Creatinine Ratio 09/01/2017 17.8  7.0 - 25.0 Final   • Anion Gap 09/01/2017 7.0  3.0 - 11.0 mmol/L Final   • WBC 09/01/2017 7.00  3.50 - 10.80 10*3/mm3 Final   • RBC 09/01/2017 4.52  3.89 - 5.14 10*6/mm3 Final   • Hemoglobin 09/01/2017 13.8  11.5 - 15.5 g/dL Final   • Hematocrit 09/01/2017 41.9  34.5 - 44.0 % Final   • MCV 09/01/2017 92.7  80.0 - 99.0 fL Final   • MCH 09/01/2017 30.5  27.0 - 31.0 pg Final   • MCHC 09/01/2017 32.9  32.0 - 36.0 g/dL Final   • RDW 09/01/2017 13.5  11.3 - 14.5 % Final   • RDW-SD 09/01/2017 45.7  37.0 - 54.0 fl Final   • MPV 09/01/2017 9.6  6.0 - 12.0 fL Final   • Platelets 09/01/2017 220  150 - 450 10*3/mm3 Final   • Neutrophil % 09/01/2017 67.6  41.0 - 71.0 % Final   • Lymphocyte % 09/01/2017 21.0* 24.0 - 44.0 % Final   • Monocyte % 09/01/2017 8.0  0.0 - 12.0 % Final   • Eosinophil % 09/01/2017 1.7  0.0 - 3.0 % Final   • Basophil % 09/01/2017 1.3* 0.0 - 1.0 %  Final   • Immature Grans % 09/01/2017 0.4  0.0 - 0.6 % Final   • Neutrophils, Absolute 09/01/2017 4.73  1.50 - 8.30 10*3/mm3 Final   • Lymphocytes, Absolute 09/01/2017 1.47  0.60 - 4.80 10*3/mm3 Final   • Monocytes, Absolute 09/01/2017 0.56  0.00 - 1.00 10*3/mm3 Final   • Eosinophils, Absolute 09/01/2017 0.12  0.00 - 0.30 10*3/mm3 Final   • Basophils, Absolute 09/01/2017 0.09  0.00 - 0.20 10*3/mm3 Final   • Immature Grans, Absolute 09/01/2017 0.03  0.00 - 0.03 10*3/mm3 Final   • RBC Morphology 09/01/2017 Normal  Normal Final   • WBC Morphology 09/01/2017 Normal  Normal Final   • Platelet Morphology 09/01/2017 Normal  Normal Final        Ct Chest With Contrast    Result Date: 9/7/2017  Narrative: EXAMINATION: CT CHEST W CONTRAST-  INDICATION: Shortness of breath, abdominal pain, elevated liver enzymes; C50.211-Malignant neoplasm of upper-inner quadrant of right female breast, staging breast malignancy.  TECHNIQUE: CT data set of the chest and mediastinum was performed with intravenous contrast enhancement.  The radiation dose reduction device was turned on for each scan per the ALARA (As Low as Reasonably Achievable) protocol.  COMPARISON: No comparison datasets are available.  FINDINGS: 1. Findings of recent lumpectomy are noted in the medial aspect of the upper right breast where there is a collection of fluid with multiple air-fluid levels and there is subcutaneous air. 2. There is marked stranding with some abnormal ill-defined attenuation in the right axilla. This is likely postoperative change from node dissection, however, underlying significant lymph nodes cannot be excluded in the right axilla. Left axillary area is unremarkable. 3. The thyroid is unremarkable. The thoracic inlet is clear. Superior mediastinum is unremarkable. The aortopulmonary window contains several borderline lymph nodes without dominant emory mass. There is no pulmonary embolus or hilar mass. There is no subcarinal mass identified.  Cardiac chambers and pericardium are within normal limits. 4. Extended window datasets are negative for dominant mass or consolidation. There is no free fluid. There is minimal stranding left base which is likely atelectatic. The osseous structures of the chest are intact, however, there is increased density in the angle of Barrera which is likely arthropathic. Blastic metastasis could present in this fashion and this will need to be carefully followed although this is currently felt to be arthropathic within the sternomanubrial junction. No other suspect osseous abnormality is identified elsewhere.      Impression: 1. There is a mass apparently containing fluid with multiple air-fluid levels in the superior medial aspect of the right breast with dissecting air in the soft tissues of the right breast presumably from the lumpectomy. There is ill-defined stranding and nodularity in the right axilla. See above description. 2. Otherwise, the mediastinum and peripheral lung zones are clear. Dominant mass is not appreciated within the chest and there is no evidence of definite active thoracic metastasis. 3. There is a small focal area of sclerosis at the sternomanubrial junction, the angle of Barrera, that will need to be carefully followed to exclude blastic disease. This is felt to be arthropathic currently.  D:  09/07/2017 E:  09/07/2017  This report was finalized on 9/7/2017 11:35 AM by Dr. Enrico Barrientos MD.      Ct Abdomen Pelvis With Contrast    Result Date: 9/7/2017  Narrative: EXAMINATION: CT ABDOMEN AND PELVIS W CONTRAST-  INDICATION: Shortness of breath, abdominal pain, elevated liver enzymes; C50.211-Malignant neoplasm of upper-inner quadrant of right female breast, recent diagnosis of breast malignancy, recent lumpectomy, transaminitis.  TECHNIQUE: CT data set of the abdomen and pelvis was performed with intravenous contrast enhancement.  The radiation dose reduction device was turned on for each scan per the  ALARA (As Low as Reasonably Achievable) protocol.  COMPARISON: Comparison datasets are not available.  FINDINGS: 1. Images through the liver are within normal limits. There is no focal liver mass or evidence of active metastatic disease. Portal vein is patent and the chelo hepatis is unremarkable. Gallbladder is negative for abnormal radiodensities. 2. The spleen is normal. There is an accessory splenule. The adrenal glands are normal and negative for metastatic disease. The pancreas is negative for mass, cyst or stranding. There is senile smooth ectasia of the pancreatic duct. 3. The periaortic retroperitoneum and kidneys are unremarkable. Renal mass is not identified. There is a nonobstructing 2 mm stone in the upper and lower portion of the left kidney as incidental findings. There is no bulky adenopathy or stranding in the mesentery or the omentum. Bowel pattern is within normal limits and there is no inflammatory bowel disease. There is no pelvic mass. The lateral pelvic sidewalls are unremarkable. 4. Lastly, the datasets of the osseous structures are negative for definite lytic or blastic osseous disease.      Impression: 1. Nonobstructing small renal stones on the left. 2. Evidence of visceral tumor, osseous metastasis, adenopathy, stranding or free fluid is not identified. Evidence of active metastatic disease in the abdomen or pelvis is, therefore, not appreciated.  D:  09/07/2017 E:  09/07/2017  This report was finalized on 9/7/2017 11:35 AM by Dr. Enrico Barrientos MD.      Mri Breast Bilateral With & Without Contrast    Result Date: 8/21/2017  Narrative: BILATERAL BREAST MRI WITH CONTRAST-08/18/2017:  CLINICAL HISTORY:  67-year-old patient who presents for preoperative breast MRI. She was recently diagnosed with a high-grade invasive ductal (no special type) carcinoma  involving her right breast.  TECHNIQUE:  Pre-contrast spin-echo T1 weighted and T2 sequences were obtained in the axial plane.  Routine  dynamic images were performed following the administration of 14.0 ml of MultiHance contrast.  No contrast complications occurred.  Delayed high resolution post contrast T1 weighted sagittal images were also obtained.   A CAD system ([employer]) was utilized for data analysis.  COMPARISON:  Comparison is made to recent outside imaging studies dated 05/12/2017, 05/10/2017, 04/10/2015, 01/04/2012 and 10/01/2010.  FINDINGS: There is mild-to-moderate background contrast enhancement of the fibroglandular tissue. There are scattered foci of contrast enhancement bilaterally. The index malignancy corresponds to an oval isointense mass with ill-defined borders. This mass is located in the posterior upper inner quadrant of the right breast and seen on images 35-47 of the postcontrast series. The mass is hyperintense on the STIR sequences. There is surrounding vascularity particularly between the mass and the medial skin. The mass actually causes a protrusion of the skin medially. There does not appear to be any abnormal enhancement within the skin. The mass demonstrates an initial rapid uptake of contrast with a delayed washout phase. The contrast enhancement is very heterogeneous and there appears to be necrotic areas within the mass, particularly along the inferior aspect of the mass. The mass measures approximately 2.2 cm (anterior to posterior) x 2.0 cm (medial to lateral) x 2.5 cm (superior to inferior) cm in size. The mass is located approximately 1 cm anterior to the pectoralis muscle. There does not appear to be any abnormal enhancement of the pectoralis muscle. There are no other concerning masses or non- masslike areas of enhancement on either side. There is no evidence of axillary adenopathy. No chest wall abnormalities are identified.      Impression: Index malignancy is seen in the posterior right upper inner quadrant and measures up to 2.5 cm from superior to inferior. There is a lot of surrounding vascularity and  portions of the mass  appears to be necrotic based on the postcontrast images as well as the appearance of the mass on the STIR sequences. There is no evidence of multifocal or multicentric disease.  BI-RADS CATEGORY: 6, KNOWN BIOPSY PROVEN MALIGNANCY.  RECOMMENDATION:  Recommend the patient followup with Dr. Elizondo for surgical planning.  This report was finalized on 8/21/2017 3:49 PM by Dr. Laine Braden MD.      Mammo Post Clip Placement Right    Result Date: 9/2/2017  Narrative: PROCEDURE:  ULTRASOUND-GUIDED RIGHT BREAST WIRE LOCALIZATION - 08/29/2017  HISTORY: Biopsy-proven malignancy right breast at an outside facility.  TECHNIQUE: Informed consent was obtained from the patient. The area to be localized was identified under ultrasound. A time-out procedure was performed. The right breast was prepped and draped in the usual sterile fashion. Subcutaneous tissues were anesthetized with 1% lidocaine without epinephrine. Under ultrasound guidance, a 5 cm  needle was placed into the target. 1 cc of methylene blue was then injected into the target. The guidewire was then placed under sonographic guidance and the localizing needle was removed. Pressure was held to achieve adequate hemostasis.  The patient was then taken to the mammography suite where CC and 90-degree lateral views of the breast were obtained. The images were annotated and sent to the operating room.  Specimen radiograph demonstrated the target in the specimen. Report was called to Dr. Elizondo in the operating room.      Impression: Successful ultrasound-guided needle localization of the biopsy-proven malignancy right breast.  PATHOLOGY: Infiltrating poorly differentiated ductal carcinoma gross tumor size 1.8 cm. Margins of the neoplasm by 0.3 cm or greater. Focal high-grade ductal carcinoma in situ. Negative for lymphovascular invasion. Right breast sentinel lymph node: Single lymph node negative for metastatic carcinoma. Result is  concordant.  RECOMMENDATION: Six-month follow-up diagnostic right mammogram as per the lumpectomy protocol.  DICTATED:     09/02/2017 EDITED:         09/02/2017  This report was finalized on 9/2/2017 3:21 PM by Dr. Mariza Webb MD.      Us Device Placement Breast Without Biopsy 1st    Result Date: 9/2/2017  Narrative: PROCEDURE:  ULTRASOUND-GUIDED RIGHT BREAST WIRE LOCALIZATION - 08/29/2017  HISTORY: Biopsy-proven malignancy right breast at an outside facility.  TECHNIQUE: Informed consent was obtained from the patient. The area to be localized was identified under ultrasound. A time-out procedure was performed. The right breast was prepped and draped in the usual sterile fashion. Subcutaneous tissues were anesthetized with 1% lidocaine without epinephrine. Under ultrasound guidance, a 5 cm  needle was placed into the target. 1 cc of methylene blue was then injected into the target. The guidewire was then placed under sonographic guidance and the localizing needle was removed. Pressure was held to achieve adequate hemostasis.  The patient was then taken to the mammography suite where CC and 90-degree lateral views of the breast were obtained. The images were annotated and sent to the operating room.  Specimen radiograph demonstrated the target in the specimen. Report was called to Dr. Elizondo in the operating room.      Impression: Successful ultrasound-guided needle localization of the biopsy-proven malignancy right breast.  PATHOLOGY: Infiltrating poorly differentiated ductal carcinoma gross tumor size 1.8 cm. Margins of the neoplasm by 0.3 cm or greater. Focal high-grade ductal carcinoma in situ. Negative for lymphovascular invasion. Right breast sentinel lymph node: Single lymph node negative for metastatic carcinoma. Result is concordant.  RECOMMENDATION: Six-month follow-up diagnostic right mammogram as per the lumpectomy protocol.  DICTATED:     09/02/2017 EDITED:         09/02/2017  This report was  finalized on 9/2/2017 3:21 PM by Dr. Mariza Webb MD.      Nm [place] Node Injection Only    Result Date: 8/30/2017  Narrative: EXAMINATION: NM SENTINEL NODE INJECTION ONLY-08/29/2017:  INDICATION: Right sentinel node biopsy; C50.911-Malignant neoplasm of unspecified site of right female breast, sentinel node, right breast cancer.  TECHNIQUE: 500 microcuries of Technetium 99m filtered Sulfur Colloid was injected into the right breast.  COMPARISON: NONE.  FINDINGS: Pharmaceutical for injection into the right breast for sentinel node mapping and lymphoscintigraphy for right breast carcinoma. Please see the procedure report for full details. The dictation is for documentation of pharmaceutical usage only.      Impression: Pharmaceutical for injection into the right breast for sentinel node mapping and lymphoscintigraphy for a right breast carcinoma.  D:  08/30/2017 E:  08/30/2017     This report was finalized on 8/30/2017 3:37 PM by Dr. Kika Ramon MD.      Mammo Breast Specimen Right    Result Date: 9/2/2017  Narrative: PROCEDURE:  ULTRASOUND-GUIDED RIGHT BREAST WIRE LOCALIZATION - 08/29/2017  HISTORY: Biopsy-proven malignancy right breast at an outside facility.  TECHNIQUE: Informed consent was obtained from the patient. The area to be localized was identified under ultrasound. A time-out procedure was performed. The right breast was prepped and draped in the usual sterile fashion. Subcutaneous tissues were anesthetized with 1% lidocaine without epinephrine. Under ultrasound guidance, a 5 cm  needle was placed into the target. 1 cc of methylene blue was then injected into the target. The guidewire was then placed under sonographic guidance and the localizing needle was removed. Pressure was held to achieve adequate hemostasis.  The patient was then taken to the mammography suite where CC and 90-degree lateral views of the breast were obtained. The images were annotated and sent to the operating room.   Specimen radiograph demonstrated the target in the specimen. Report was called to Dr. Elizondo in the operating room.      Impression: Successful ultrasound-guided needle localization of the biopsy-proven malignancy right breast.  PATHOLOGY: Infiltrating poorly differentiated ductal carcinoma gross tumor size 1.8 cm. Margins of the neoplasm by 0.3 cm or greater. Focal high-grade ductal carcinoma in situ. Negative for lymphovascular invasion. Right breast sentinel lymph node: Single lymph node negative for metastatic carcinoma. Result is concordant.  RECOMMENDATION: Six-month follow-up diagnostic right mammogram as per the lumpectomy protocol.  DICTATED:     09/02/2017 EDITED:         09/02/2017  This report was finalized on 9/2/2017 3:21 PM by Dr. Mariza Webb MD.        ASSESSMENT: The patient is a very pleasant 67 y.o. female  with stage I a triple negative right breast cancer.     PROBLEM LIST:  1.  Triple negative right breast cancer [id number] stage IA:  A.  Presented with self felt lump right breast  B.  Mammogram done May 23, 2017 showed 1.8 cm nodule 1:00 right breast  C.  Ultrasound-guided biopsy done on July 5, 2017 revealed triple negative high-grade ductal carcinoma  D. Status post right breast lumpectomy with clear margins and negative sentinel node biopsy done on 8/29/2017.   E. Started adjuvant chemotherapy using Taxotere Cytoxan September 13, 2017  2.  Hypertension  3.  Hypercholesteremia  4.  Neuropathy  5.  Insomnia  6.  Anxiety    PLAN:  1. I would proceed with treatment today as scheduled using Taxotere Cytoxan every 3 weeks cycle #1.  We'll add Neulasta for each dose this is in compliance with NCCN guidelines.  2.  The patient follow with me in 3 weeks for cycle #2 out of 4 planned.  3.  I will add Ativan 1 mg IV every 4 hours dose of chemotherapy secondary to anxiety.  4. We discussed the side effects of this regimen including alopecia, nausea, fatigue, myelosuppression, infusion reaction,  neuropathy, diarrhea, and potential death.  5.  I will monitor patient blood work that she is in treatment including blood counts kidney function and liver enzymes.  6.  I did go over the CAT scan results with the patient and her daughter I reassured her there is no evidence of metastatic disease.  7.  Patient will benefit from adjuvant radiation after completion of chemotherapy.  8.  Patient will need to have bilateral mammogram follow-up 6 months after completion of radiation.  9.  I will start patient on Restoril 15 mg daily at bedtime as needed for insomnia.  10.  I will start patient on Zofran as needed for chemotherapy-induced nausea.  [place] Grissom MD  9/13/2017   9:59 AM

## 2017-09-22 ENCOUNTER — TELEPHONE (OUTPATIENT)
Dept: INTERNAL MEDICINE | Facility: CLINIC | Age: 67
End: 2017-09-22

## 2017-09-22 DIAGNOSIS — M25.519 SHOULDER PAIN, UNSPECIFIED CHRONICITY, UNSPECIFIED LATERALITY: ICD-10-CM

## 2017-09-22 DIAGNOSIS — R29.898 WEAKNESS OF LOWER EXTREMITY, UNSPECIFIED LATERALITY: Primary | ICD-10-CM

## 2017-09-22 DIAGNOSIS — M54.9 BACK PAIN, UNSPECIFIED BACK LOCATION, UNSPECIFIED BACK PAIN LATERALITY, UNSPECIFIED CHRONICITY: ICD-10-CM

## 2017-09-22 DIAGNOSIS — R26.89 BALANCE PROBLEM: ICD-10-CM

## 2017-09-22 NOTE — TELEPHONE ENCOUNTER
PATIENT'S DAUGHTER IS CALLING AND WOULD LIKE TO KNOW IF HE CAN ORDER HOME HEALTH, PT & OT THROUGH [facility]  ATTN: GIAN  FAX # [phone]    SHE'S HAVING LOW EXT WEAKNESS, BACK PAIN,SHOULDER PAIN, BALANCE ISSUES-NEAR FALL.

## 2017-10-02 ENCOUNTER — TELEPHONE (OUTPATIENT)
Dept: INTERNAL MEDICINE | Facility: CLINIC | Age: 67
End: 2017-10-02

## 2017-10-02 NOTE — TELEPHONE ENCOUNTER
Liliam ansh [facility] called requesting a call back to get a verbal order to for patient to have physical therapy.

## 2017-10-04 ENCOUNTER — OFFICE VISIT (OUTPATIENT)
Dept: ONCOLOGY | Facility: CLINIC | Age: 67
End: 2017-10-04

## 2017-10-04 ENCOUNTER — INFUSION (OUTPATIENT)
Dept: ONCOLOGY | Facility: HOSPITAL | Age: 67
End: 2017-10-04

## 2017-10-04 VITALS
SYSTOLIC BLOOD PRESSURE: 141 MMHG | BODY MASS INDEX: 28.52 KG/M2 | OXYGEN SATURATION: 98 % | WEIGHT: 155 LBS | TEMPERATURE: 97.4 F | HEART RATE: 70 BPM | DIASTOLIC BLOOD PRESSURE: 103 MMHG | HEIGHT: 62 IN

## 2017-10-04 DIAGNOSIS — Z17.1 MALIGNANT NEOPLASM OF UPPER-INNER QUADRANT OF RIGHT BREAST IN FEMALE, ESTROGEN RECEPTOR NEGATIVE (HCC): Primary | ICD-10-CM

## 2017-10-04 DIAGNOSIS — Z17.1 MALIGNANT NEOPLASM OF UPPER-INNER QUADRANT OF RIGHT BREAST IN FEMALE, ESTROGEN RECEPTOR NEGATIVE (HCC): ICD-10-CM

## 2017-10-04 DIAGNOSIS — C50.211 BREAST CANCER OF UPPER-INNER QUADRANT OF RIGHT FEMALE BREAST (HCC): Primary | ICD-10-CM

## 2017-10-04 DIAGNOSIS — C50.211 MALIGNANT NEOPLASM OF UPPER-INNER QUADRANT OF RIGHT BREAST IN FEMALE, ESTROGEN RECEPTOR NEGATIVE (HCC): ICD-10-CM

## 2017-10-04 DIAGNOSIS — C50.211 MALIGNANT NEOPLASM OF UPPER-INNER QUADRANT OF RIGHT BREAST IN FEMALE, ESTROGEN RECEPTOR NEGATIVE (HCC): Primary | ICD-10-CM

## 2017-10-04 LAB
ALBUMIN SERPL-MCNC: 4.8 G/DL (ref 3.5–5)
ALBUMIN/GLOB SERPL: 1.3 G/DL (ref 1–2)
ALP SERPL-CCNC: 91 U/L (ref 38–126)
ALT SERPL W P-5'-P-CCNC: 40 U/L (ref 13–69)
ANION GAP SERPL CALCULATED.3IONS-SCNC: 17.1 MMOL/L
AST SERPL-CCNC: 34 U/L (ref 15–46)
BILIRUB SERPL-MCNC: 0.9 MG/DL (ref 0.2–1.3)
BUN BLD-MCNC: 15 MG/DL (ref 7–20)
BUN/CREAT SERPL: 25 (ref 7.1–23.5)
CALCIUM SPEC-SCNC: 10 MG/DL (ref 8.4–10.2)
CHLORIDE SERPL-SCNC: 108 MMOL/L (ref 98–107)
CO2 SERPL-SCNC: 23 MMOL/L (ref 26–30)
CREAT BLD-MCNC: 0.6 MG/DL (ref 0.6–1.3)
DEPRECATED RDW RBC AUTO: 44 FL (ref 37–54)
ERYTHROCYTE [DISTWIDTH] IN BLOOD BY AUTOMATED COUNT: 13.9 % (ref 11.5–14.5)
GFR SERPL CREATININE-BSD FRML MDRD: 99 ML/MIN/1.73
GLOBULIN UR ELPH-MCNC: 3.6 GM/DL
GLUCOSE BLD-MCNC: 129 MG/DL (ref 74–98)
HCT VFR BLD AUTO: 38.4 % (ref 37–47)
HGB BLD-MCNC: 12.8 G/DL (ref 12–16)
LYMPHOCYTES # BLD MANUAL: 0.54 10*3/MM3 (ref 0.6–3.4)
LYMPHOCYTES NFR BLD MANUAL: 8 % (ref 10–50)
MCH RBC QN AUTO: 30.2 PG (ref 27–31)
MCHC RBC AUTO-ENTMCNC: 33.3 G/DL (ref 30–37)
MCV RBC AUTO: 90.6 FL (ref 81–99)
NEUTROPHILS # BLD AUTO: 6.17 10*3/MM3 (ref 2–6.9)
NEUTROPHILS NFR BLD MANUAL: 86 % (ref 37–80)
NEUTS BAND NFR BLD MANUAL: 6 % (ref 0–6)
PLATELET # BLD AUTO: 295 10*3/MM3 (ref 130–400)
PMV BLD AUTO: 9.4 FL (ref 6–12)
POTASSIUM BLD-SCNC: 4.1 MMOL/L (ref 3.5–5.1)
PROT SERPL-MCNC: 8.4 G/DL (ref 6.3–8.2)
RBC # BLD AUTO: 4.24 10*6/MM3 (ref 4.2–5.4)
RBC MORPH BLD: NORMAL
SCAN SLIDE: NORMAL
SMALL PLATELETS BLD QL SMEAR: ADEQUATE
SODIUM BLD-SCNC: 144 MMOL/L (ref 137–145)
WBC MORPH BLD: NORMAL
WBC NRBC COR # BLD: 6.71 10*3/MM3 (ref 4.8–10.8)

## 2017-10-04 PROCEDURE — 99215 OFFICE O/P EST HI 40 MIN: CPT | Performed by: INTERNAL MEDICINE

## 2017-10-04 PROCEDURE — 25010000002 PALONOSETRON PER 25 MCG: Performed by: INTERNAL MEDICINE

## 2017-10-04 PROCEDURE — 36415 COLL VENOUS BLD VENIPUNCTURE: CPT

## 2017-10-04 PROCEDURE — 85007 BL SMEAR W/DIFF WBC COUNT: CPT

## 2017-10-04 PROCEDURE — 85025 COMPLETE CBC W/AUTO DIFF WBC: CPT

## 2017-10-04 PROCEDURE — 80053 COMPREHEN METABOLIC PANEL: CPT

## 2017-10-04 PROCEDURE — 25010000002 DOCETAXEL 20 MG/ML CONCENTRATION 1 ML VIAL: Performed by: INTERNAL MEDICINE

## 2017-10-04 PROCEDURE — 25010000002 DOCETAXEL 20 MG/ML CONCENTRATION 4 ML VIAL: Performed by: INTERNAL MEDICINE

## 2017-10-04 PROCEDURE — 96417 CHEMO IV INFUS EACH ADDL SEQ: CPT

## 2017-10-04 PROCEDURE — 96375 TX/PRO/DX INJ NEW DRUG ADDON: CPT

## 2017-10-04 PROCEDURE — 96413 CHEMO IV INFUSION 1 HR: CPT

## 2017-10-04 PROCEDURE — 25010000002 CYCLOPHOSPHAMIDE PER 100 MG: Performed by: INTERNAL MEDICINE

## 2017-10-04 PROCEDURE — 25010000002 PEGFILGRASTIM 6 MG/0.6ML PREFILLED SYRINGE KIT: Performed by: INTERNAL MEDICINE

## 2017-10-04 PROCEDURE — 96377 APPLICATON ON-BODY INJECTOR: CPT

## 2017-10-04 RX ORDER — TIZANIDINE HYDROCHLORIDE 4 MG/1
4 CAPSULE, GELATIN COATED ORAL 3 TIMES DAILY
COMMUNITY
End: 2018-06-08

## 2017-10-04 RX ORDER — LORAZEPAM 2 MG/ML
0.5 INJECTION INTRAMUSCULAR ONCE
Status: DISCONTINUED | OUTPATIENT
Start: 2017-10-04 | End: 2017-10-04 | Stop reason: HOSPADM

## 2017-10-04 RX ORDER — SODIUM CHLORIDE 9 MG/ML
250 INJECTION, SOLUTION INTRAVENOUS ONCE
Status: DISCONTINUED | OUTPATIENT
Start: 2017-10-04 | End: 2017-10-04 | Stop reason: HOSPADM

## 2017-10-04 RX ORDER — LORAZEPAM 0.5 MG/1
0.5 TABLET ORAL 2 TIMES DAILY PRN
Qty: 60 TABLET | Refills: 2 | OUTPATIENT
Start: 2017-10-04 | End: 2018-06-08

## 2017-10-04 RX ORDER — LORAZEPAM 2 MG/ML
0.5 INJECTION INTRAMUSCULAR ONCE
Status: CANCELLED
Start: 2017-10-04 | End: 2017-10-04

## 2017-10-04 RX ORDER — PALONOSETRON 0.05 MG/ML
0.25 INJECTION, SOLUTION INTRAVENOUS ONCE
Status: COMPLETED | OUTPATIENT
Start: 2017-10-04 | End: 2017-10-04

## 2017-10-04 RX ADMIN — DOCETAXEL 130 MG: 20 INJECTION, SOLUTION INTRAVENOUS at 11:11

## 2017-10-04 RX ADMIN — PALONOSETRON HYDROCHLORIDE 0.25 MG: 0.25 INJECTION INTRAVENOUS at 11:09

## 2017-10-04 RX ADMIN — PEGFILGRASTIM 6 MG: KIT SUBCUTANEOUS at 13:09

## 2017-10-04 RX ADMIN — CYCLOPHOSPHAMIDE 1030 MG: 500 INJECTION, POWDER, FOR SOLUTION INTRAVENOUS; ORAL at 12:19

## 2017-10-04 NOTE — PROGRESS NOTES
DATE OF VISIT: 10/4/2017    REASON FOR VISIT: Followup for triple negative right breast cancer [id number].      HISTORY OF PRESENT ILLNESS: The patient is a very pleasant 67 y.o. female  with past medical history significant for breast cancer diagnosed 7/5/2017. The patient presented with right breast lump.  She has been very busy taking care of her sick  who passed away May 2017.  She had bilateral mammogram May 12, 2017 that revealed 1.8 cm abnormality in the right breast at 1 o'clock position.  Ultrasound-guided biopsy done on July 5, 2017 showed invasive triple negative high grade ductal carcinoma. The patient had right breast lumpectomy with sentinel lymph node biopsy done 8/29/2017 with final pathology showing 1.7 cm high-grade ductal carcinoma, clear surgical margins, negative sentinel lymph nodes, no lymphovascular invasion, and triple negative disease.  She was started on adjuvant chemotherapy using Taxotere Cytoxan September 13, 2017.  The patient is here today for chemotherapy cycle #2.      SUBJECTIVE: The patient was able to tolerate chemotherapy without side effects.  She had nausea improved with Zofran.  She had diffuse bony pain as well as fatigue.  She is having some anxiety. she denied any fever or chills, no night sweats, denied any headaches.  She is anxious about starting chemotherapy.    PAST MEDICAL HISTORY/SOCIAL HISTORY/FAMILY HISTORY: Unchanged from my prior documentation done on 08/07/2017.     Review of Systems   Constitutional: Negative for activity change, appetite change, chills, fatigue, fever and unexpected weight change.   HENT: Negative for hearing loss, mouth sores, nosebleeds, sore throat and trouble swallowing.    Eyes: Negative for visual disturbance.   Respiratory: Negative for cough, chest tightness, shortness of breath and wheezing.    Cardiovascular: Negative for chest pain, palpitations and leg swelling.   Gastrointestinal: Negative for abdominal distention, abdominal  pain, blood in stool, constipation, diarrhea, nausea, rectal pain and vomiting.   Endocrine: Negative for cold intolerance and heat intolerance.   Genitourinary: Negative for difficulty urinating, dysuria, frequency and urgency.   Musculoskeletal: Negative for arthralgias, back pain, gait problem, joint swelling and myalgias.   Skin: Negative for rash.   Neurological: Negative for dizziness, tremors, syncope, weakness, light-headedness, numbness and headaches.   Hematological: Negative for adenopathy. Does not bruise/bleed easily.   Psychiatric/Behavioral: Negative for confusion, sleep disturbance and suicidal ideas. The patient is not nervous/anxious.          Current Outpatient Prescriptions:   •  amLODIPine (NORVASC) 5 MG tablet, Take 1 tablet by mouth Every Night., Disp: , Rfl:   •  atorvastatin (LIPITOR) 40 MG tablet, Take 1 tablet by mouth Daily., Disp: 30 tablet, Rfl: 11  •  cetirizine (zyrTEC) 10 MG tablet, Take 10 mg by mouth Daily., Disp: , Rfl:   •  dexamethasone (DECADRON) 4 MG tablet, Take 2 tablets in the morning the day after chemo (Day 2), then take 2 tablets twice daily on days 3 & 4.  Take with food., Disp: 10 tablet, Rfl: 3  •  dexamethasone (DECADRON) 4 MG tablet, Take 2 tablets oral twice a day for 3 consecutive days beginning the day before chemotherapy and continue for 6 doses., Disp: 30 tablet, Rfl: 3  •  fluocinonide-emollient (LIDEX-E) 0.05 % cream, Apply  topically 2 (Two) Times a Day., Disp: 15 g, Rfl: 0  •  fluticasone (VERAMYST) 27.5 MCG/SPRAY nasal spray, 2 sprays into each nostril Daily., Disp: , Rfl:   •  gabapentin (NEURONTIN) 300 MG capsule, Take 1 capsule by mouth 2 (Two) Times a Day. 1-2 tab, Disp: , Rfl:   •  ibuprofen (ADVIL,MOTRIN) 600 MG tablet, Take 600 mg by mouth Every 8 (Eight) Hours As Needed., Disp: , Rfl:   •  ondansetron (ZOFRAN) 8 MG tablet, Take 1 tablet by mouth 3 (Three) Times a Day As Needed for Nausea or Vomiting., Disp: 30 tablet, Rfl: 5  •  ondansetron  "(ZOFRAN) 8 MG tablet, Take 1 tablet by mouth 3 (Three) Times a Day As Needed for Nausea or Vomiting., Disp: 30 tablet, Rfl: 5  •  temazepam (RESTORIL) 15 MG capsule, Take 1 capsule by mouth At Night As Needed for Sleep., Disp: 30 capsule, Rfl: 2  •  TiZANidine (ZANAFLEX) 4 MG capsule, Take 4 mg by mouth 3 (Three) Times a Day., Disp: , Rfl:   •  traMADol (ULTRAM) 50 MG tablet, Take 50 mg by mouth Every 6 (Six) Hours As Needed for Moderate Pain ., Disp: , Rfl:     PHYSICAL EXAMINATION:   BP (!) 141/103  Pulse 70  Temp 97.4 °F (36.3 °C)  Ht 61.5\" (156.2 cm)  Wt 155 lb (70.3 kg)  SpO2 98%  BMI 28.81 kg/m2   ECOG Performance Status: 1 - Symptomatic but completely ambulatory  General Appearance:  alert, cooperative, no apparent distress and appears stated age   Neurologic/Psychiatric: A&O x 3, gait steady, appropriate affect, strength 5/5 in all muscle groups   HEENT:  Normocephalic, without obvious abnormality, mucous membranes moist   Neck: Supple, symmetrical, trachea midline, no adenopathy;  No thyromegaly, masses, or tenderness   Lungs:   Clear to auscultation bilaterally; respirations regular, even, and unlabored bilaterally   Heart:  Regular rate and rhythm, no murmurs appreciated   Abdomen:   Soft, non-tender, non-distended and no organomegaly   Lymph nodes: No cervical, supraclavicular, inguinal or axillary adenopathy noted   Extremities: Normal, atraumatic; no clubbing, cyanosis, or edema    Skin: No rashes, ulcers, or suspicious lesions noted     No visits with results within 2 Week(s) from this visit.  Latest known visit with results is:    Infusion on 09/13/2017   Component Date Value Ref Range Status   • Glucose 09/13/2017 96  74 - 98 mg/dL Final   • BUN 09/13/2017 16  7 - 20 mg/dL Final   • Creatinine 09/13/2017 0.80  0.60 - 1.30 mg/dL Final   • Sodium 09/13/2017 145  137 - 145 mmol/L Final   • Potassium 09/13/2017 3.7  3.5 - 5.1 mmol/L Final   • Chloride 09/13/2017 107  98 - 107 mmol/L Final   • CO2 " 09/13/2017 27.0  26.0 - 30.0 mmol/L Final   • Calcium 09/13/2017 9.8  8.4 - 10.2 mg/dL Final   • Total Protein 09/13/2017 8.0  6.3 - 8.2 g/dL Final   • Albumin 09/13/2017 4.70  3.50 - 5.00 g/dL Final   • ALT (SGPT) 09/13/2017 40  13 - 69 U/L Final   • AST (SGOT) 09/13/2017 35  15 - 46 U/L Final   • Alkaline Phosphatase 09/13/2017 67  38 - 126 U/L Final   • Total Bilirubin 09/13/2017 1.1  0.2 - 1.3 mg/dL Final   • eGFR Non African Amer 09/13/2017 71  >60 mL/min/1.73 Final   • Globulin 09/13/2017 3.3  gm/dL Final   • A/G Ratio 09/13/2017 1.4  1.0 - 2.0 g/dL Final   • BUN/Creatinine Ratio 09/13/2017 20.0  7.1 - 23.5 Final   • Anion Gap 09/13/2017 14.7  mmol/L Final   • WBC 09/13/2017 3.91* 4.80 - 10.80 10*3/mm3 Final   • RBC 09/13/2017 4.43  4.20 - 5.40 10*6/mm3 Final   • Hemoglobin 09/13/2017 13.5  12.0 - 16.0 g/dL Final   • Hematocrit 09/13/2017 40.9  37.0 - 47.0 % Final   • MCV 09/13/2017 92.3  81.0 - 99.0 fL Final   • MCH 09/13/2017 30.5  27.0 - 31.0 pg Final   • MCHC 09/13/2017 33.0  30.0 - 37.0 g/dL Final   • RDW 09/13/2017 13.1  11.5 - 14.5 % Final   • RDW-SD 09/13/2017 44.7  37.0 - 54.0 fl Final   • MPV 09/13/2017 9.4  6.0 - 12.0 fL Final   • Platelets 09/13/2017 207  130 - 400 10*3/mm3 Final   • Neutrophil % 09/13/2017 59.5  37.0 - 80.0 % Final   • Lymphocyte % 09/13/2017 27.9  10.0 - 50.0 % Final   • Monocyte % 09/13/2017 9.2  0.0 - 12.0 % Final   • Eosinophil % 09/13/2017 2.3  0.0 - 7.0 % Final   • Basophil % 09/13/2017 0.8  0.0 - 2.5 % Final   • Immature Grans % 09/13/2017 0.3  0.0 - 0.6 % Final   • Neutrophils, Absolute 09/13/2017 2.33  2.00 - 6.90 10*3/mm3 Final   • Lymphocytes, Absolute 09/13/2017 1.09  0.60 - 3.40 10*3/mm3 Final   • Monocytes, Absolute 09/13/2017 0.36  0.00 - 0.90 10*3/mm3 Final   • Eosinophils, Absolute 09/13/2017 0.09  0.00 - 0.70 10*3/mm3 Final   • Basophils, Absolute 09/13/2017 0.03  0.00 - 0.20 10*3/mm3 Final   • Immature Grans, Absolute 09/13/2017 0.01  0.00 - 0.06 10*3/mm3 Final    • [facility] 09/13/2017 0.0  0.0 - 0.0 /100 WBC Final        Ct Chest With Contrast    Result Date: 9/7/2017  Narrative: EXAMINATION: CT CHEST W CONTRAST-  INDICATION: Shortness of breath, abdominal pain, elevated liver enzymes; C50.211-Malignant neoplasm of upper-inner quadrant of right female breast, staging breast malignancy.  TECHNIQUE: CT data set of the chest and mediastinum was performed with intravenous contrast enhancement.  The radiation dose reduction device was turned on for each scan per the ALARA (As Low as Reasonably Achievable) protocol.  COMPARISON: No comparison datasets are available.  FINDINGS: 1. Findings of recent lumpectomy are noted in the medial aspect of the upper right breast where there is a collection of fluid with multiple air-fluid levels and there is subcutaneous air. 2. There is marked stranding with some abnormal ill-defined attenuation in the right axilla. This is likely postoperative change from node dissection, however, underlying significant lymph nodes cannot be excluded in the right axilla. Left axillary area is unremarkable. 3. The thyroid is unremarkable. The thoracic inlet is clear. Superior mediastinum is unremarkable. The aortopulmonary window contains several borderline lymph nodes without dominant emory mass. There is no pulmonary embolus or hilar mass. There is no subcarinal mass identified. Cardiac chambers and pericardium are within normal limits. 4. Extended window datasets are negative for dominant mass or consolidation. There is no free fluid. There is minimal stranding left base which is likely atelectatic. The osseous structures of the chest are intact, however, there is increased density in the angle of Barrera which is likely arthropathic. Blastic metastasis could present in this fashion and this will need to be carefully followed although this is currently felt to be arthropathic within the sternomanubrial junction. No other suspect osseous abnormality is  identified elsewhere.      Impression: 1. There is a mass apparently containing fluid with multiple air-fluid levels in the superior medial aspect of the right breast with dissecting air in the soft tissues of the right breast presumably from the lumpectomy. There is ill-defined stranding and nodularity in the right axilla. See above description. 2. Otherwise, the mediastinum and peripheral lung zones are clear. Dominant mass is not appreciated within the chest and there is no evidence of definite active thoracic metastasis. 3. There is a small focal area of sclerosis at the sternomanubrial junction, the angle of Barrera, that will need to be carefully followed to exclude blastic disease. This is felt to be arthropathic currently.  D:  09/07/2017 E:  09/07/2017  This report was finalized on 9/7/2017 11:35 AM by Dr. Enrico Barrientos MD.      Ct Abdomen Pelvis With Contrast    Result Date: 9/7/2017  Narrative: EXAMINATION: CT ABDOMEN AND PELVIS W CONTRAST-  INDICATION: Shortness of breath, abdominal pain, elevated liver enzymes; C50.211-Malignant neoplasm of upper-inner quadrant of right female breast, recent diagnosis of breast malignancy, recent lumpectomy, transaminitis.  TECHNIQUE: CT data set of the abdomen and pelvis was performed with intravenous contrast enhancement.  The radiation dose reduction device was turned on for each scan per the ALARA (As Low as Reasonably Achievable) protocol.  COMPARISON: Comparison datasets are not available.  FINDINGS: 1. Images through the liver are within normal limits. There is no focal liver mass or evidence of active metastatic disease. Portal vein is patent and the chelo hepatis is unremarkable. Gallbladder is negative for abnormal radiodensities. 2. The spleen is normal. There is an accessory splenule. The adrenal glands are normal and negative for metastatic disease. The pancreas is negative for mass, cyst or stranding. There is senile smooth ectasia of the pancreatic duct.  3. The periaortic retroperitoneum and kidneys are unremarkable. Renal mass is not identified. There is a nonobstructing 2 mm stone in the upper and lower portion of the left kidney as incidental findings. There is no bulky adenopathy or stranding in the mesentery or the omentum. Bowel pattern is within normal limits and there is no inflammatory bowel disease. There is no pelvic mass. The lateral pelvic sidewalls are unremarkable. 4. Lastly, the datasets of the osseous structures are negative for definite lytic or blastic osseous disease.      Impression: 1. Nonobstructing small renal stones on the left. 2. Evidence of visceral tumor, osseous metastasis, adenopathy, stranding or free fluid is not identified. Evidence of active metastatic disease in the abdomen or pelvis is, therefore, not appreciated.  D:  09/07/2017 E:  09/07/2017  This report was finalized on 9/7/2017 11:35 AM by Dr. Enrico Barrientos MD.        ASSESSMENT: The patient is a very pleasant 67 y.o. female  with stage I a triple negative right breast cancer.     PROBLEM LIST:  1.  Triple negative right breast cancer [id number] stage IA:  A.  Presented with self felt lump right breast  B.  Mammogram done May 23, 2017 showed 1.8 cm nodule 1:00 right breast  C.  Ultrasound-guided biopsy done on July 5, 2017 revealed triple negative high-grade ductal carcinoma  D. Status post right breast lumpectomy with clear margins and negative sentinel node biopsy done on 8/29/2017.   E. Started adjuvant chemotherapy using Taxotere Cytoxan September 13, 2017, status post 1 cycle  2.  Hypertension  3.  Hypercholesteremia  4.  Neuropathy  5.  Insomnia  6.  Anxiety    PLAN:  1. I will proceed with treatment today as scheduled using Taxotere Cytoxan every 3 weeks cycle.  We'll add Neulasta for each dose this is in compliance with NCCN guidelines.  2.  The patient follow with me in 3 weeks for cycle #3 out of 4 planned.  3.  I will add Ativan 0.5 mg IV before each dose of  chemotherapy secondary to anxiety.  I will also prescribe Ativan 0.5 mg twice a day as needed for anxiety.  4. We discussed the side effects of this regimen including alopecia, nausea, fatigue, myelosuppression, infusion reaction, neuropathy, diarrhea, and potential death.  5.  I will monitor patient blood work that she is in treatment including blood counts kidney function and liver enzymes.  6.  I did go over the CAT scan results with the patient and her daughter I reassured her there is no evidence of metastatic disease.  7.  Patient will benefit from adjuvant radiation after completion of chemotherapy.  8.  Patient will need to have bilateral mammogram follow-up 6 months after completion of radiation.  9.  I will start patient on Restoril 15 mg daily at bedtime as needed for insomnia.  10.  I will start patient on Zofran as needed for chemotherapy-induced nausea.  America Grissom MD  10/4/2017   9:37 AM

## 2017-10-19 ENCOUNTER — TELEPHONE (OUTPATIENT)
Dept: INTERNAL MEDICINE | Facility: CLINIC | Age: 67
End: 2017-10-19

## 2017-10-19 ENCOUNTER — OUTSIDE FACILITY SERVICE (OUTPATIENT)
Dept: INTERNAL MEDICINE | Facility: CLINIC | Age: 67
End: 2017-10-19

## 2017-10-19 PROCEDURE — G0180 MD CERTIFICATION HHA PATIENT: HCPCS | Performed by: INTERNAL MEDICINE

## 2017-10-19 NOTE — TELEPHONE ENCOUNTER
PHYSICAL THERAPIST AT [facility]  PT COMPLAINS OF CHEST TIGHTNESS IN PM, PER DR COLEMAN PT NEEDS APPT, SPOKE TO ESTELLA-PT DAUGHTER-SCHEDULED PT FOR 300PM Friday PER FRANKI ELLIOTT INFORMED

## 2017-10-20 ENCOUNTER — OFFICE VISIT (OUTPATIENT)
Dept: INTERNAL MEDICINE | Facility: CLINIC | Age: 67
End: 2017-10-20

## 2017-10-20 VITALS
OXYGEN SATURATION: 97 % | TEMPERATURE: 98 F | DIASTOLIC BLOOD PRESSURE: 90 MMHG | HEIGHT: 62 IN | BODY MASS INDEX: 28.22 KG/M2 | WEIGHT: 153.38 LBS | SYSTOLIC BLOOD PRESSURE: 130 MMHG | HEART RATE: 83 BPM

## 2017-10-20 DIAGNOSIS — C50.211 MALIGNANT NEOPLASM OF UPPER-INNER QUADRANT OF RIGHT BREAST IN FEMALE, ESTROGEN RECEPTOR NEGATIVE (HCC): ICD-10-CM

## 2017-10-20 DIAGNOSIS — Z17.1 MALIGNANT NEOPLASM OF UPPER-INNER QUADRANT OF RIGHT BREAST IN FEMALE, ESTROGEN RECEPTOR NEGATIVE (HCC): ICD-10-CM

## 2017-10-20 DIAGNOSIS — R42 DIZZINESS: Primary | ICD-10-CM

## 2017-10-20 DIAGNOSIS — F34.1 DYSTHYMIA: ICD-10-CM

## 2017-10-20 PROCEDURE — 99214 OFFICE O/P EST MOD 30 MIN: CPT | Performed by: INTERNAL MEDICINE

## 2017-10-20 RX ORDER — DULOXETIN HYDROCHLORIDE 20 MG/1
20 CAPSULE, DELAYED RELEASE ORAL DAILY
Qty: 30 CAPSULE | Refills: 3 | Status: SHIPPED | OUTPATIENT
Start: 2017-10-20

## 2017-10-20 NOTE — PROGRESS NOTES
Nikolay Roman is a 67 y.o. female    HPI coming in for evaluation accompanied by her daughter was giving us most of the history. The patient has had episodes of retrosternal heaviness with occasional shortness of breath. She feels dizzy and lightheaded. Anxious crises easily. Has sleep disturbances. Recent diagnosis of breast CA with chemotherapy undergoing. Has had CT of her chest which was unremarkable  Symptoms are not related with exertion. No recent fall or head injury. She is on temazepam for her insomnia. Uses Zanaflex for her neck spasms and pain history of C-spine DJD. Uses tramadol on a when necessary basis  Compliant with her medications. No recent vomiting diarrhea.    The following portions of the patient's history were reviewed and updated as appropriate: allergies, current medications, past family history, past medical history, past social history, past surgical history, and problem list.     Review of Systems   Constitutional: Negative.  Negative for activity change, appetite change, fatigue and fever.   HENT: Negative for congestion, ear discharge, ear pain and trouble swallowing.    Eyes: Negative for photophobia and visual disturbance.   Respiratory: Negative for cough and shortness of breath.    Cardiovascular: Negative for chest pain and palpitations.   Gastrointestinal: Negative for abdominal distention, abdominal pain, constipation, diarrhea, nausea and vomiting.   Endocrine: Negative.    Genitourinary: Negative for dysuria, hematuria and urgency.   Musculoskeletal: Positive for arthralgias. Negative for back pain, joint swelling and myalgias.   Skin: Negative for color change and rash.   Allergic/Immunologic: Negative.    Neurological: Positive for dizziness. Negative for weakness, light-headedness and headaches.   Hematological: Negative for adenopathy. Does not bruise/bleed easily.   Psychiatric/Behavioral: Positive for sleep disturbance. Negative for agitation, confusion and  "dysphoric mood. The patient is nervous/anxious.        Visit Vitals   • /90   • Pulse 83   • Temp 98 °F (36.7 °C)   • Ht 61.5\" (156.2 cm)   • Wt 153 lb 6 oz (69.6 kg)   • SpO2 97%   • BMI 28.51 kg/m2       Objective  Physical Exam   Constitutional: She is oriented to person, place, and time. She appears well-developed and well-nourished. No distress.   HENT:   Nose: Nose normal.   Mouth/Throat: Oropharynx is clear and moist.   Eyes: Conjunctivae and EOM are normal. No scleral icterus.   Neck: No tracheal deviation present. No thyromegaly present.   Cardiovascular: Normal rate and regular rhythm.  Exam reveals no friction rub.    No murmur heard.  Pulmonary/Chest: No respiratory distress. She has no wheezes. She has no rales.   Abdominal: Soft. She exhibits no distension and no mass. There is no tenderness. There is no guarding.   Musculoskeletal: Normal range of motion. She exhibits no deformity.   Lymphadenopathy:     She has no cervical adenopathy.   Neurological: She is alert and oriented to person, place, and time. She has normal reflexes. No cranial nerve deficit. Coordination abnormal.   Skin: Skin is warm and dry. No rash noted. No erythema.   Psychiatric: She has a normal mood and affect. Her behavior is normal. Judgment and thought content normal.       Diagnoses and all orders for this visit:    Dizziness without evidence of orthostatic drop in her blood pressure today at the visit. Evidence of some ataxia on walking down the hallway. Check CT of the brain in view of her recent malignancy. No facial asymmetry noted. No history of visual disturbances  Recent lab work including CBC CMP were unremarkable will hold off on repeating labs now    Malignant neoplasm of upper-inner quadrant of right breast in female, estrogen receptor negative. Stable following up with oncology    Dysthymia discussed with patient and her daughter trial of duloxetine 20 mg daily at bedtime follow-up again in a month      "

## 2017-10-25 ENCOUNTER — OFFICE VISIT (OUTPATIENT)
Dept: ONCOLOGY | Facility: CLINIC | Age: 67
End: 2017-10-25

## 2017-10-25 ENCOUNTER — INFUSION (OUTPATIENT)
Dept: ONCOLOGY | Facility: HOSPITAL | Age: 67
End: 2017-10-25

## 2017-10-25 VITALS
TEMPERATURE: 97.5 F | DIASTOLIC BLOOD PRESSURE: 87 MMHG | WEIGHT: 153 LBS | HEART RATE: 67 BPM | SYSTOLIC BLOOD PRESSURE: 155 MMHG | RESPIRATION RATE: 16 BRPM | BODY MASS INDEX: 28.44 KG/M2

## 2017-10-25 DIAGNOSIS — Z17.1 MALIGNANT NEOPLASM OF UPPER-INNER QUADRANT OF RIGHT BREAST IN FEMALE, ESTROGEN RECEPTOR NEGATIVE (HCC): Primary | ICD-10-CM

## 2017-10-25 DIAGNOSIS — C50.211 MALIGNANT NEOPLASM OF UPPER-INNER QUADRANT OF RIGHT BREAST IN FEMALE, ESTROGEN RECEPTOR NEGATIVE (HCC): Primary | ICD-10-CM

## 2017-10-25 DIAGNOSIS — C50.211 MALIGNANT NEOPLASM OF UPPER-INNER QUADRANT OF RIGHT BREAST IN FEMALE, ESTROGEN RECEPTOR NEGATIVE (HCC): ICD-10-CM

## 2017-10-25 DIAGNOSIS — C50.211 BREAST CANCER OF UPPER-INNER QUADRANT OF RIGHT FEMALE BREAST (HCC): Primary | ICD-10-CM

## 2017-10-25 DIAGNOSIS — Z17.1 MALIGNANT NEOPLASM OF UPPER-INNER QUADRANT OF RIGHT BREAST IN FEMALE, ESTROGEN RECEPTOR NEGATIVE (HCC): ICD-10-CM

## 2017-10-25 LAB
ALBUMIN SERPL-MCNC: 4.8 G/DL (ref 3.5–5)
ALBUMIN/GLOB SERPL: 1.4 G/DL (ref 1–2)
ALP SERPL-CCNC: 96 U/L (ref 38–126)
ALT SERPL W P-5'-P-CCNC: 42 U/L (ref 13–69)
ANION GAP SERPL CALCULATED.3IONS-SCNC: 18.6 MMOL/L
AST SERPL-CCNC: 39 U/L (ref 15–46)
BASOPHILS # BLD AUTO: 0.01 10*3/MM3 (ref 0–0.2)
BASOPHILS NFR BLD AUTO: 0.1 % (ref 0–2.5)
BILIRUB SERPL-MCNC: 0.6 MG/DL (ref 0.2–1.3)
BUN BLD-MCNC: 16 MG/DL (ref 7–20)
BUN/CREAT SERPL: 26.7 (ref 7.1–23.5)
CALCIUM SPEC-SCNC: 10.3 MG/DL (ref 8.4–10.2)
CHLORIDE SERPL-SCNC: 106 MMOL/L (ref 98–107)
CO2 SERPL-SCNC: 24 MMOL/L (ref 26–30)
CREAT BLD-MCNC: 0.6 MG/DL (ref 0.6–1.3)
DEPRECATED RDW RBC AUTO: 49.5 FL (ref 37–54)
EOSINOPHIL # BLD AUTO: 0 10*3/MM3 (ref 0–0.7)
EOSINOPHIL NFR BLD AUTO: 0 % (ref 0–7)
ERYTHROCYTE [DISTWIDTH] IN BLOOD BY AUTOMATED COUNT: 15 % (ref 11.5–14.5)
GFR SERPL CREATININE-BSD FRML MDRD: 99 ML/MIN/1.73
GLOBULIN UR ELPH-MCNC: 3.4 GM/DL
GLUCOSE BLD-MCNC: 126 MG/DL (ref 74–98)
HCT VFR BLD AUTO: 36.4 % (ref 37–47)
HGB BLD-MCNC: 12.2 G/DL (ref 12–16)
IMM GRANULOCYTES # BLD: 0.03 10*3/MM3 (ref 0–0.06)
IMM GRANULOCYTES NFR BLD: 0.4 % (ref 0–0.6)
LYMPHOCYTES # BLD AUTO: 0.47 10*3/MM3 (ref 0.6–3.4)
LYMPHOCYTES NFR BLD AUTO: 6.9 % (ref 10–50)
MCH RBC QN AUTO: 30.6 PG (ref 27–31)
MCHC RBC AUTO-ENTMCNC: 33.5 G/DL (ref 30–37)
MCV RBC AUTO: 91.2 FL (ref 81–99)
MONOCYTES # BLD AUTO: 0.11 10*3/MM3 (ref 0–0.9)
MONOCYTES NFR BLD AUTO: 1.6 % (ref 0–12)
NEUTROPHILS # BLD AUTO: 6.23 10*3/MM3 (ref 2–6.9)
NEUTROPHILS NFR BLD AUTO: 91 % (ref 37–80)
NRBC BLD MANUAL-RTO: 0 /100 WBC (ref 0–0)
PLATELET # BLD AUTO: 312 10*3/MM3 (ref 130–400)
PMV BLD AUTO: 9.1 FL (ref 6–12)
POTASSIUM BLD-SCNC: 4.6 MMOL/L (ref 3.5–5.1)
PROT SERPL-MCNC: 8.2 G/DL (ref 6.3–8.2)
RBC # BLD AUTO: 3.99 10*6/MM3 (ref 4.2–5.4)
SODIUM BLD-SCNC: 144 MMOL/L (ref 137–145)
WBC NRBC COR # BLD: 6.85 10*3/MM3 (ref 4.8–10.8)

## 2017-10-25 PROCEDURE — 25010000002 PALONOSETRON PER 25 MCG: Performed by: INTERNAL MEDICINE

## 2017-10-25 PROCEDURE — 25010000002 DOCETAXEL 20 MG/ML CONCENTRATION 4 ML VIAL: Performed by: INTERNAL MEDICINE

## 2017-10-25 PROCEDURE — 25010000002 DOCETAXEL 20 MG/ML CONCENTRATION 1 ML VIAL: Performed by: INTERNAL MEDICINE

## 2017-10-25 PROCEDURE — 96417 CHEMO IV INFUS EACH ADDL SEQ: CPT

## 2017-10-25 PROCEDURE — 96374 THER/PROPH/DIAG INJ IV PUSH: CPT

## 2017-10-25 PROCEDURE — 25010000002 CYCLOPHOSPHAMIDE PER 100 MG: Performed by: INTERNAL MEDICINE

## 2017-10-25 PROCEDURE — 80053 COMPREHEN METABOLIC PANEL: CPT

## 2017-10-25 PROCEDURE — 85025 COMPLETE CBC W/AUTO DIFF WBC: CPT

## 2017-10-25 PROCEDURE — 25010000002 LORAZEPAM PER 2 MG: Performed by: INTERNAL MEDICINE

## 2017-10-25 PROCEDURE — 96377 APPLICATON ON-BODY INJECTOR: CPT

## 2017-10-25 PROCEDURE — 36415 COLL VENOUS BLD VENIPUNCTURE: CPT

## 2017-10-25 PROCEDURE — 25010000002 PEGFILGRASTIM 6 MG/0.6ML PREFILLED SYRINGE KIT: Performed by: INTERNAL MEDICINE

## 2017-10-25 PROCEDURE — 96413 CHEMO IV INFUSION 1 HR: CPT

## 2017-10-25 PROCEDURE — 96375 TX/PRO/DX INJ NEW DRUG ADDON: CPT

## 2017-10-25 PROCEDURE — 99215 OFFICE O/P EST HI 40 MIN: CPT | Performed by: INTERNAL MEDICINE

## 2017-10-25 RX ORDER — LORAZEPAM 2 MG/ML
0.5 INJECTION INTRAMUSCULAR ONCE
Status: COMPLETED | OUTPATIENT
Start: 2017-10-25 | End: 2017-10-25

## 2017-10-25 RX ORDER — LORAZEPAM 2 MG/ML
0.5 INJECTION INTRAMUSCULAR ONCE
Status: CANCELLED
Start: 2017-10-25 | End: 2017-10-25

## 2017-10-25 RX ORDER — PALONOSETRON 0.05 MG/ML
0.25 INJECTION, SOLUTION INTRAVENOUS ONCE
Status: COMPLETED | OUTPATIENT
Start: 2017-10-25 | End: 2017-10-25

## 2017-10-25 RX ORDER — SODIUM CHLORIDE 9 MG/ML
250 INJECTION, SOLUTION INTRAVENOUS ONCE
Status: DISCONTINUED | OUTPATIENT
Start: 2017-10-25 | End: 2017-10-25 | Stop reason: HOSPADM

## 2017-10-25 RX ADMIN — LORAZEPAM 0.5 MG: 2 INJECTION, SOLUTION INTRAMUSCULAR; INTRAVENOUS at 10:40

## 2017-10-25 RX ADMIN — PALONOSETRON HYDROCHLORIDE 0.25 MG: 0.25 INJECTION INTRAVENOUS at 10:55

## 2017-10-25 RX ADMIN — DOCETAXEL 130 MG: 20 INJECTION, SOLUTION INTRAVENOUS at 10:58

## 2017-10-25 RX ADMIN — CYCLOPHOSPHAMIDE 1030 MG: 500 INJECTION, POWDER, FOR SOLUTION INTRAVENOUS; ORAL at 12:04

## 2017-10-25 RX ADMIN — PEGFILGRASTIM 6 MG: KIT SUBCUTANEOUS at 13:07

## 2017-10-25 NOTE — PROGRESS NOTES
DATE OF VISIT: 10/25/2017    REASON FOR VISIT: Followup for triple negative right breast cancer [id number].      HISTORY OF PRESENT ILLNESS: The patient is a very pleasant 67 y.o. female  with past medical history significant for breast cancer diagnosed 7/5/2017. The patient presented with right breast lump.  She has been very busy taking care of her sick  who passed away May 2017.  She had bilateral mammogram May 12, 2017 that revealed 1.8 cm abnormality in the right breast at 1 o'clock position.  Ultrasound-guided biopsy done on July 5, 2017 showed invasive triple negative high grade ductal carcinoma. The patient had right breast lumpectomy with sentinel lymph node biopsy done 8/29/2017 with final pathology showing 1.7 cm high-grade ductal carcinoma, clear surgical margins, negative sentinel lymph nodes, no lymphovascular invasion, and triple negative disease.  She was started on adjuvant chemotherapy using Taxotere Cytoxan September 13, 2017.  The patient is here today for chemotherapy cycle #3.      SUBJECTIVE: The patient was able to tolerate chemotherapy without side effects.  She had nausea improved with Zofran.  She had diffuse bony pain as well as fatigue.  She is having some anxiety. she denied any fever or chills, no night sweats, denied any headaches.  She is anxious about starting chemotherapy.    PAST MEDICAL HISTORY/SOCIAL HISTORY/FAMILY HISTORY: Unchanged from my prior documentation done on 08/07/2017.     Review of Systems   Constitutional: Negative for activity change, appetite change, chills, fatigue, fever and unexpected weight change.   HENT: Negative for hearing loss, mouth sores, nosebleeds, sore throat and trouble swallowing.    Eyes: Negative for visual disturbance.   Respiratory: Negative for cough, chest tightness, shortness of breath and wheezing.    Cardiovascular: Negative for chest pain, palpitations and leg swelling.   Gastrointestinal: Negative for abdominal distention, abdominal  pain, blood in stool, constipation, diarrhea, nausea, rectal pain and vomiting.   Endocrine: Negative for cold intolerance and heat intolerance.   Genitourinary: Negative for difficulty urinating, dysuria, frequency and urgency.   Musculoskeletal: Negative for arthralgias, back pain, gait problem, joint swelling and myalgias.   Skin: Negative for rash.   Neurological: Negative for dizziness, tremors, syncope, weakness, light-headedness, numbness and headaches.   Hematological: Negative for adenopathy. Does not bruise/bleed easily.   Psychiatric/Behavioral: Negative for confusion, sleep disturbance and suicidal ideas. The patient is not nervous/anxious.          Current Outpatient Prescriptions:   •  amLODIPine (NORVASC) 5 MG tablet, Take 2.5 mg by mouth Every Night., Disp: , Rfl:   •  atorvastatin (LIPITOR) 40 MG tablet, Take 1 tablet by mouth Daily., Disp: 30 tablet, Rfl: 11  •  cetirizine (zyrTEC) 10 MG tablet, Take 10 mg by mouth Daily., Disp: , Rfl:   •  dexamethasone (DECADRON) 4 MG tablet, Take 2 tablets in the morning the day after chemo (Day 2), then take 2 tablets twice daily on days 3 & 4.  Take with food., Disp: 10 tablet, Rfl: 3  •  DULoxetine (CYMBALTA) 20 MG capsule, Take 1 capsule by mouth Daily., Disp: 30 capsule, Rfl: 3  •  LORazepam (ATIVAN) 0.5 MG tablet, Take 1 tablet by mouth 2 (Two) Times a Day As Needed for Anxiety., Disp: 60 tablet, Rfl: 2  •  ondansetron (ZOFRAN) 8 MG tablet, Take 1 tablet by mouth 3 (Three) Times a Day As Needed for Nausea or Vomiting., Disp: 30 tablet, Rfl: 5  •  TiZANidine (ZANAFLEX) 4 MG capsule, Take 4 mg by mouth 3 (Three) Times a Day., Disp: , Rfl:   •  traMADol (ULTRAM) 50 MG tablet, Take 50 mg by mouth Every 6 (Six) Hours As Needed for Moderate Pain ., Disp: , Rfl:     PHYSICAL EXAMINATION:   /87  Pulse 67  Temp 97.5 °F (36.4 °C) (Temporal Artery )   Resp 16  Wt 153 lb (69.4 kg)  BMI 28.44 kg/m2   ECOG Performance Status: 1 - Symptomatic but completely  ambulatory  General Appearance:  alert, cooperative, no apparent distress and appears stated age   Neurologic/Psychiatric: A&O x 3, gait steady, appropriate affect, strength 5/5 in all muscle groups   HEENT:  Normocephalic, without obvious abnormality, mucous membranes moist   Neck: Supple, symmetrical, trachea midline, no adenopathy;  No thyromegaly, masses, or tenderness   Lungs:   Clear to auscultation bilaterally; respirations regular, even, and unlabored bilaterally   Heart:  Regular rate and rhythm, no murmurs appreciated   Abdomen:   Soft, non-tender, non-distended and no organomegaly   Lymph nodes: No cervical, supraclavicular, inguinal or axillary adenopathy noted   Extremities: Normal, atraumatic; no clubbing, cyanosis, or edema    Skin: No rashes, ulcers, or suspicious lesions noted     No visits with results within 2 Week(s) from this visit.  Latest known visit with results is:    Infusion on 10/04/2017   Component Date Value Ref Range Status   • Glucose 10/04/2017 129* 74 - 98 mg/dL Final   • BUN 10/04/2017 15  7 - 20 mg/dL Final   • Creatinine 10/04/2017 0.60  0.60 - 1.30 mg/dL Final   • Sodium 10/04/2017 144  137 - 145 mmol/L Final   • Potassium 10/04/2017 4.1  3.5 - 5.1 mmol/L Final   • Chloride 10/04/2017 108* 98 - 107 mmol/L Final   • CO2 10/04/2017 23.0* 26.0 - 30.0 mmol/L Final   • Calcium 10/04/2017 10.0  8.4 - 10.2 mg/dL Final   • Total Protein 10/04/2017 8.4* 6.3 - 8.2 g/dL Final   • Albumin 10/04/2017 4.80  3.50 - 5.00 g/dL Final   • ALT (SGPT) 10/04/2017 40  13 - 69 U/L Final   • AST (SGOT) 10/04/2017 34  15 - 46 U/L Final   • Alkaline Phosphatase 10/04/2017 91  38 - 126 U/L Final   • Total Bilirubin 10/04/2017 0.9  0.2 - 1.3 mg/dL Final   • eGFR Non African Amer 10/04/2017 99  >60 mL/min/1.73 Final   • Globulin 10/04/2017 3.6  gm/dL Final   • A/G Ratio 10/04/2017 1.3  1.0 - 2.0 g/dL Final   • BUN/Creatinine Ratio 10/04/2017 25.0* 7.1 - 23.5 Final   • Anion Gap 10/04/2017 17.1  mmol/L Final    • WBC 10/04/2017 6.71  4.80 - 10.80 10*3/mm3 Final   • RBC 10/04/2017 4.24  4.20 - 5.40 10*6/mm3 Final   • Hemoglobin 10/04/2017 12.8  12.0 - 16.0 g/dL Final   • Hematocrit 10/04/2017 38.4  37.0 - 47.0 % Final   • MCV 10/04/2017 90.6  81.0 - 99.0 fL Final   • MCH 10/04/2017 30.2  27.0 - 31.0 pg Final   • MCHC 10/04/2017 33.3  30.0 - 37.0 g/dL Final   • RDW 10/04/2017 13.9  11.5 - 14.5 % Final   • RDW-SD 10/04/2017 44.0  37.0 - 54.0 fl Final   • MPV 10/04/2017 9.4  6.0 - 12.0 fL Final   • Platelets 10/04/2017 295  130 - 400 10*3/mm3 Final   • Scan Slide 10/04/2017    Final    See Manual Differential Results   • Neutrophil % 10/04/2017 86.0* 37.0 - 80.0 % Final   • Lymphocyte % 10/04/2017 8.0* 10.0 - 50.0 % Final   • Bands %  10/04/2017 6.0  0.0 - 6.0 % Final   • Neutrophils Absolute 10/04/2017 6.17  2.00 - 6.90 10*3/mm3 Final   • Lymphocytes Absolute 10/04/2017 0.54* 0.60 - 3.40 10*3/mm3 Final   • RBC Morphology 10/04/2017 Normal  Normal Final   • WBC Morphology 10/04/2017 Normal  Normal Final   • Platelet Estimate 10/04/2017 Adequate  Normal Final        No results found.    ASSESSMENT: The patient is a very pleasant 67 y.o. female  with stage I a triple negative right breast cancer.     PROBLEM LIST:  1.  Triple negative right breast cancer [id number] stage IA:  A.  Presented with self felt lump right breast  B.  Mammogram done May 23, 2017 showed 1.8 cm nodule 1:00 right breast  C.  Ultrasound-guided biopsy done on July 5, 2017 revealed triple negative high-grade ductal carcinoma  D. Status post right breast lumpectomy with clear margins and negative sentinel node biopsy done on 8/29/2017.   E. Started adjuvant chemotherapy using Taxotere Cytoxan September 13, 2017, status post 2 cycle  2.  Hypertension  3.  Hypercholesteremia  4.  Neuropathy  5.  Insomnia  6.  Anxiety    PLAN:  1. I will proceed with treatment today as scheduled using Taxotere Cytoxan every 3 weeks cycle.  We'll add Neulasta for each dose this is  in compliance with NCCN guidelines.  2.  The patient follow with me in 3 weeks for cycle #4 out of 4 planned.  3.  I will add Ativan 0.5 mg IV before each dose of chemotherapy secondary to anxiety.  I will also prescribe Ativan 0.5 mg twice a day as needed for anxiety.  4. We discussed the side effects of this regimen including alopecia, nausea, fatigue, myelosuppression, infusion reaction, neuropathy, diarrhea, and potential death.  5.  I will monitor patient blood work that she is in treatment including blood counts kidney function and liver enzymes.  6.  I did go over the CAT scan results with the patient and her daughter I reassured her there is no evidence of metastatic disease.  7.  Patient will benefit from adjuvant radiation after completion of chemotherapy.  8.  Patient will need to have bilateral mammogram follow-up 6 months after completion of radiation.  9.  I will start patient on Restoril 15 mg daily at bedtime as needed for insomnia.  10.  I will start patient on Zofran as needed for chemotherapy-induced nausea.  America Grissom MD  10/25/2017   9:19 AM

## 2017-10-27 ENCOUNTER — HOSPITAL ENCOUNTER (OUTPATIENT)
Dept: CT IMAGING | Facility: HOSPITAL | Age: 67
Discharge: HOME | End: 2017-10-27
Attending: INTERNAL MEDICINE | Admitting: INTERNAL MEDICINE

## 2017-10-27 DIAGNOSIS — R42 DIZZINESS: ICD-10-CM

## 2017-10-27 PROCEDURE — 70450 CT HEAD/BRAIN W/O DYE: CPT

## 2017-11-01 ENCOUNTER — TELEPHONE (OUTPATIENT)
Dept: INTERNAL MEDICINE | Facility: CLINIC | Age: 67
End: 2017-11-01

## 2017-11-01 NOTE — TELEPHONE ENCOUNTER
Zuleyma from [facility] called regarding Mrs. Roman. She states that there is a major drug interaction between Tramadol and Cymbalta. She just wanted to make you aware of the situation incase you needed to change any of the medications.

## 2017-11-03 ENCOUNTER — TELEPHONE (OUTPATIENT)
Dept: ONCOLOGY | Facility: CLINIC | Age: 67
End: 2017-11-03

## 2017-11-03 NOTE — TELEPHONE ENCOUNTER
Patient having no other symptoms, recommended that she try tylenol OTC for low grade fever. If she becomes symptomatic, to return call

## 2017-11-21 NOTE — PROGRESS NOTES
DATE OF VISIT: 11/22/2017    REASON FOR VISIT: Followup for triple negative right breast cancer [id number].      HISTORY OF PRESENT ILLNESS: The patient is a very pleasant 67 y.o. female  with past medical history significant for breast cancer diagnosed 7/5/2017. The patient presented with right breast lump.  She has been very busy taking care of her sick  who passed away May 2017.  She had bilateral mammogram May 12, 2017 that revealed 1.8 cm abnormality in the right breast at 1 o'clock position.  Ultrasound-guided biopsy done on July 5, 2017 showed invasive triple negative high grade ductal carcinoma. The patient had right breast lumpectomy with sentinel lymph node biopsy done 8/29/2017 with final pathology showing 1.7 cm high-grade ductal carcinoma, clear surgical margins, negative sentinel lymph nodes, no lymphovascular invasion, and triple negative disease.  She was started on adjuvant chemotherapy using Taxotere Cytoxan September 13, 2017.  The patient is here today for chemotherapy cycle #4.      SUBJECTIVE: The patient was able to tolerate chemotherapy without serious side effects.  She had nausea improved with Zofran.  She had diffuse bony pain as well as fatigue.  She is having some anxiety. she denied any fever or chills, no night sweats, denied any headaches.  She has mild heartburn.  She is complaining of nails discoloration.    PAST MEDICAL HISTORY/SOCIAL HISTORY/FAMILY HISTORY: Unchanged from my prior documentation done on 08/07/2017.     Review of Systems   Constitutional: Negative for activity change, appetite change, chills, fatigue, fever and unexpected weight change.   HENT: Negative for hearing loss, mouth sores, nosebleeds, sore throat and trouble swallowing.    Eyes: Negative for visual disturbance.   Respiratory: Negative for cough, chest tightness, shortness of breath and wheezing.    Cardiovascular: Negative for chest pain, palpitations and leg swelling.   Gastrointestinal: Negative for  abdominal distention, abdominal pain, blood in stool, constipation, diarrhea, nausea, rectal pain and vomiting.   Endocrine: Negative for cold intolerance and heat intolerance.   Genitourinary: Negative for difficulty urinating, dysuria, frequency and urgency.   Musculoskeletal: Negative for arthralgias, back pain, gait problem, joint swelling and myalgias.   Skin: Negative for rash.   Neurological: Negative for dizziness, tremors, syncope, weakness, light-headedness, numbness and headaches.   Hematological: Negative for adenopathy. Does not bruise/bleed easily.   Psychiatric/Behavioral: Negative for confusion, sleep disturbance and suicidal ideas. The patient is not nervous/anxious.          Current Outpatient Prescriptions:   •  amLODIPine (NORVASC) 5 MG tablet, Take 2.5 mg by mouth Every Night., Disp: , Rfl:   •  atorvastatin (LIPITOR) 40 MG tablet, Take 1 tablet by mouth Daily., Disp: 30 tablet, Rfl: 11  •  cetirizine (zyrTEC) 10 MG tablet, Take 10 mg by mouth Daily., Disp: , Rfl:   •  dexamethasone (DECADRON) 4 MG tablet, Take 2 tablets in the morning the day after chemo (Day 2), then take 2 tablets twice daily on days 3 & 4.  Take with food., Disp: 10 tablet, Rfl: 3  •  DULoxetine (CYMBALTA) 20 MG capsule, Take 1 capsule by mouth Daily., Disp: 30 capsule, Rfl: 3  •  LORazepam (ATIVAN) 0.5 MG tablet, Take 1 tablet by mouth 2 (Two) Times a Day As Needed for Anxiety., Disp: 60 tablet, Rfl: 2  •  ondansetron (ZOFRAN) 8 MG tablet, Take 1 tablet by mouth 3 (Three) Times a Day As Needed for Nausea or Vomiting., Disp: 30 tablet, Rfl: 5  •  TiZANidine (ZANAFLEX) 4 MG capsule, Take 4 mg by mouth 3 (Three) Times a Day., Disp: , Rfl:   •  traMADol (ULTRAM) 50 MG tablet, Take 50 mg by mouth Every 6 (Six) Hours As Needed for Moderate Pain ., Disp: , Rfl:     PHYSICAL EXAMINATION:   /87  Pulse 71  Temp 97.3 °F (36.3 °C) (Temporal Artery )   Resp 14  Wt 150 lb (68 kg)  BMI 27.88 kg/m2   ECOG Performance Status: 1 -  Symptomatic but completely ambulatory  General Appearance:  alert, cooperative, no apparent distress and appears stated age   Neurologic/Psychiatric: A&O x 3, gait steady, appropriate affect, strength 5/5 in all muscle groups   HEENT:  Normocephalic, without obvious abnormality, mucous membranes moist   Neck: Supple, symmetrical, trachea midline, no adenopathy;  No thyromegaly, masses, or tenderness   Lungs:   Clear to auscultation bilaterally; respirations regular, even, and unlabored bilaterally   Heart:  Regular rate and rhythm, no murmurs appreciated   Abdomen:   Soft, non-tender, non-distended and no organomegaly   Lymph nodes: No cervical, supraclavicular, inguinal or axillary adenopathy noted   Extremities: Normal, atraumatic; no clubbing, cyanosis, or edema    Skin: No rashes, ulcers, or suspicious lesions noted     Infusion on 11/22/2017   Component Date Value Ref Range Status   • WBC 11/22/2017 3.66* 4.80 - 10.80 10*3/mm3 Final   • RBC 11/22/2017 3.79* 4.20 - 5.40 10*6/mm3 Final   • Hemoglobin 11/22/2017 11.7* 12.0 - 16.0 g/dL Final   • Hematocrit 11/22/2017 36.1* 37.0 - 47.0 % Final   • MCV 11/22/2017 95.3  81.0 - 99.0 fL Final   • MCH 11/22/2017 30.9  27.0 - 31.0 pg Final   • MCHC 11/22/2017 32.4  30.0 - 37.0 g/dL Final   • RDW 11/22/2017 16.6* 11.5 - 14.5 % Final   • RDW-SD 11/22/2017 57.9* 37.0 - 54.0 fl Final   • MPV 11/22/2017 9.0  6.0 - 12.0 fL Final   • Platelets 11/22/2017 212  130 - 400 10*3/mm3 Final   • Neutrophil % 11/22/2017 59.0  37.0 - 80.0 % Final   • Lymphocyte % 11/22/2017 24.6  10.0 - 50.0 % Final   • Monocyte % 11/22/2017 11.2  0.0 - 12.0 % Final   • Eosinophil % 11/22/2017 4.1  0.0 - 7.0 % Final   • Basophil % 11/22/2017 0.8  0.0 - 2.5 % Final   • Immature Grans % 11/22/2017 0.3  0.0 - 0.6 % Final   • Neutrophils, Absolute 11/22/2017 2.16  2.00 - 6.90 10*3/mm3 Final   • Lymphocytes, Absolute 11/22/2017 0.90  0.60 - 3.40 10*3/mm3 Final   • Monocytes, Absolute 11/22/2017 0.41  0.00 -  0.90 10*3/mm3 Final   • Eosinophils, Absolute 11/22/2017 0.15  0.00 - 0.70 10*3/mm3 Final   • Basophils, Absolute 11/22/2017 0.03  0.00 - 0.20 10*3/mm3 Final   • Immature Grans, Absolute 11/22/2017 0.01  0.00 - 0.06 10*3/mm3 Final   • nRBC 11/22/2017 0.0  0.0 - 0.0 /100 WBC Final        Ct Head Without Contrast    Result Date: 10/30/2017  Narrative: EXAMINATION: CT HEAD WO CONTRAST - 10/27/2017  INDICATION: R42-Dizziness and giddiness. Breast cancer.  TECHNIQUE: Multiple axial CT imaging is obtained of the head from skull base to skull vertex without the administration of intravenous contrast.  The radiation dose reduction device was turned on for each scan per the ALARA (As Low as Reasonably Achievable) protocol.  COMPARISON: None.  FINDINGS: There is no intracranial hemorrhage or hydrocephalus. No mass, mass effect, midline shift. No abnormal extra-axial fluid collections identified. Bony structures reveal no evidence of osseous abnormality. The visualized paranasal sinuses are clear. The mastoid air cells are patent.      Impression: No acute intracranial abnormality is identified.  DICTATED:     10/27/2017 EDITED:          10/27/2017      This report was finalized on 10/30/2017 8:51 AM by Dr. Kika Ramon MD.        ASSESSMENT: The patient is a very pleasant 67 y.o. female  with stage I a triple negative right breast cancer.     PROBLEM LIST:  1.  Triple negative right breast cancer [id number] stage IA:  A.  Presented with self felt lump right breast  B.  Mammogram done May 23, 2017 showed 1.8 cm nodule 1:00 right breast  C.  Ultrasound-guided biopsy done on July 5, 2017 revealed triple negative high-grade ductal carcinoma  D. Status post right breast lumpectomy with clear margins and negative sentinel node biopsy done on 8/29/2017.   E. Started adjuvant chemotherapy using Taxotere Cytoxan September 13, 2017, status post 3 cycle  2.  Hypertension  3.  Hypercholesteremia  4.  Neuropathy  5.  Insomnia  6.   Anxiety  7. Paronychia    PLAN:  1. I will proceed with treatment today as scheduled using Taxotere Cytoxan every 3 weeks cycle.  We'll add Neulasta for each dose this is in compliance with NCCN guidelines.  2.  The patient follow with me in 4 weeks.  3.  I will continue Ativan 0.5 mg IV before each dose of chemotherapy secondary to anxiety.  I will also prescribe Ativan 0.5 mg twice a day as needed for anxiety.  4. We discussed the side effects of this regimen including alopecia, nausea, fatigue, myelosuppression, infusion reaction, neuropathy, diarrhea, and potential death.  5.  I will monitor patient blood work that she is in treatment including blood counts kidney function and liver enzymes.  6.  I did go over the CAT scan results with the patient and her daughter I reassured her there is no evidence of metastatic disease.  7.  Patient will benefit from adjuvant radiation after completion of chemotherapy.  8.  Patient will need to have bilateral mammogram follow-up 6 months after completion of radiation.  9.  I will continue patient on Restoril 15 mg daily at bedtime as needed for insomnia.  10.  I will continue patient on Zofran as needed for chemotherapy-induced nausea.  11.  I asked patient to use tea tree oil to help with her nail issues.    America Grissom MD  11/22/2017   9:17 AM

## 2017-11-22 ENCOUNTER — OFFICE VISIT (OUTPATIENT)
Dept: ONCOLOGY | Facility: CLINIC | Age: 67
End: 2017-11-22

## 2017-11-22 ENCOUNTER — INFUSION (OUTPATIENT)
Dept: ONCOLOGY | Facility: HOSPITAL | Age: 67
End: 2017-11-22

## 2017-11-22 VITALS
TEMPERATURE: 97.3 F | HEART RATE: 71 BPM | DIASTOLIC BLOOD PRESSURE: 87 MMHG | WEIGHT: 150 LBS | SYSTOLIC BLOOD PRESSURE: 137 MMHG | BODY MASS INDEX: 27.88 KG/M2 | RESPIRATION RATE: 14 BRPM

## 2017-11-22 DIAGNOSIS — Z17.1 MALIGNANT NEOPLASM OF UPPER-INNER QUADRANT OF RIGHT BREAST IN FEMALE, ESTROGEN RECEPTOR NEGATIVE (HCC): Primary | ICD-10-CM

## 2017-11-22 DIAGNOSIS — C50.211 BREAST CANCER OF UPPER-INNER QUADRANT OF RIGHT FEMALE BREAST (HCC): Primary | ICD-10-CM

## 2017-11-22 DIAGNOSIS — Z17.1 MALIGNANT NEOPLASM OF UPPER-INNER QUADRANT OF RIGHT BREAST IN FEMALE, ESTROGEN RECEPTOR NEGATIVE (HCC): ICD-10-CM

## 2017-11-22 DIAGNOSIS — C50.211 MALIGNANT NEOPLASM OF UPPER-INNER QUADRANT OF RIGHT BREAST IN FEMALE, ESTROGEN RECEPTOR NEGATIVE (HCC): Primary | ICD-10-CM

## 2017-11-22 DIAGNOSIS — C50.211 MALIGNANT NEOPLASM OF UPPER-INNER QUADRANT OF RIGHT BREAST IN FEMALE, ESTROGEN RECEPTOR NEGATIVE (HCC): ICD-10-CM

## 2017-11-22 LAB
ALBUMIN SERPL-MCNC: 4.2 G/DL (ref 3.5–5)
ALBUMIN/GLOB SERPL: 1.4 G/DL (ref 1–2)
ALP SERPL-CCNC: 64 U/L (ref 38–126)
ALT SERPL W P-5'-P-CCNC: 44 U/L (ref 13–69)
ANION GAP SERPL CALCULATED.3IONS-SCNC: 15.7 MMOL/L
AST SERPL-CCNC: 41 U/L (ref 15–46)
BASOPHILS # BLD AUTO: 0.03 10*3/MM3 (ref 0–0.2)
BASOPHILS NFR BLD AUTO: 0.8 % (ref 0–2.5)
BILIRUB SERPL-MCNC: 0.9 MG/DL (ref 0.2–1.3)
BUN BLD-MCNC: 14 MG/DL (ref 7–20)
BUN/CREAT SERPL: 17.5 (ref 7.1–23.5)
CALCIUM SPEC-SCNC: 9.7 MG/DL (ref 8.4–10.2)
CHLORIDE SERPL-SCNC: 107 MMOL/L (ref 98–107)
CO2 SERPL-SCNC: 26 MMOL/L (ref 26–30)
CREAT BLD-MCNC: 0.8 MG/DL (ref 0.6–1.3)
DEPRECATED RDW RBC AUTO: 57.9 FL (ref 37–54)
EOSINOPHIL # BLD AUTO: 0.15 10*3/MM3 (ref 0–0.7)
EOSINOPHIL NFR BLD AUTO: 4.1 % (ref 0–7)
ERYTHROCYTE [DISTWIDTH] IN BLOOD BY AUTOMATED COUNT: 16.6 % (ref 11.5–14.5)
GFR SERPL CREATININE-BSD FRML MDRD: 71 ML/MIN/1.73
GLOBULIN UR ELPH-MCNC: 2.9 GM/DL
GLUCOSE BLD-MCNC: 95 MG/DL (ref 74–98)
HCT VFR BLD AUTO: 36.1 % (ref 37–47)
HGB BLD-MCNC: 11.7 G/DL (ref 12–16)
IMM GRANULOCYTES # BLD: 0.01 10*3/MM3 (ref 0–0.06)
IMM GRANULOCYTES NFR BLD: 0.3 % (ref 0–0.6)
LYMPHOCYTES # BLD AUTO: 0.9 10*3/MM3 (ref 0.6–3.4)
LYMPHOCYTES NFR BLD AUTO: 24.6 % (ref 10–50)
MCH RBC QN AUTO: 30.9 PG (ref 27–31)
MCHC RBC AUTO-ENTMCNC: 32.4 G/DL (ref 30–37)
MCV RBC AUTO: 95.3 FL (ref 81–99)
MONOCYTES # BLD AUTO: 0.41 10*3/MM3 (ref 0–0.9)
MONOCYTES NFR BLD AUTO: 11.2 % (ref 0–12)
NEUTROPHILS # BLD AUTO: 2.16 10*3/MM3 (ref 2–6.9)
NEUTROPHILS NFR BLD AUTO: 59 % (ref 37–80)
NRBC BLD MANUAL-RTO: 0 /100 WBC (ref 0–0)
PLATELET # BLD AUTO: 212 10*3/MM3 (ref 130–400)
PMV BLD AUTO: 9 FL (ref 6–12)
POTASSIUM BLD-SCNC: 3.7 MMOL/L (ref 3.5–5.1)
PROT SERPL-MCNC: 7.1 G/DL (ref 6.3–8.2)
RBC # BLD AUTO: 3.79 10*6/MM3 (ref 4.2–5.4)
SODIUM BLD-SCNC: 145 MMOL/L (ref 137–145)
WBC NRBC COR # BLD: 3.66 10*3/MM3 (ref 4.8–10.8)

## 2017-11-22 PROCEDURE — 80053 COMPREHEN METABOLIC PANEL: CPT

## 2017-11-22 PROCEDURE — 36415 COLL VENOUS BLD VENIPUNCTURE: CPT

## 2017-11-22 PROCEDURE — 96374 THER/PROPH/DIAG INJ IV PUSH: CPT

## 2017-11-22 PROCEDURE — 99215 OFFICE O/P EST HI 40 MIN: CPT | Performed by: INTERNAL MEDICINE

## 2017-11-22 PROCEDURE — 96377 APPLICATON ON-BODY INJECTOR: CPT

## 2017-11-22 PROCEDURE — 96417 CHEMO IV INFUS EACH ADDL SEQ: CPT

## 2017-11-22 PROCEDURE — 25010000002 LORAZEPAM PER 2 MG: Performed by: INTERNAL MEDICINE

## 2017-11-22 PROCEDURE — 25010000002 PALONOSETRON PER 25 MCG: Performed by: INTERNAL MEDICINE

## 2017-11-22 PROCEDURE — 96413 CHEMO IV INFUSION 1 HR: CPT

## 2017-11-22 PROCEDURE — 25010000002 PEGFILGRASTIM 6 MG/0.6ML PREFILLED SYRINGE KIT: Performed by: INTERNAL MEDICINE

## 2017-11-22 PROCEDURE — 96375 TX/PRO/DX INJ NEW DRUG ADDON: CPT

## 2017-11-22 PROCEDURE — 85025 COMPLETE CBC W/AUTO DIFF WBC: CPT

## 2017-11-22 PROCEDURE — 25010000002 CYCLOPHOSPHAMIDE PER 100 MG: Performed by: INTERNAL MEDICINE

## 2017-11-22 PROCEDURE — 25010000002 DOCETAXEL 20 MG/ML CONCENTRATION 4 ML VIAL: Performed by: INTERNAL MEDICINE

## 2017-11-22 PROCEDURE — 25010000002 DEXAMETHASONE PER 1 MG: Performed by: INTERNAL MEDICINE

## 2017-11-22 PROCEDURE — 96367 TX/PROPH/DG ADDL SEQ IV INF: CPT

## 2017-11-22 RX ORDER — PALONOSETRON 0.05 MG/ML
0.25 INJECTION, SOLUTION INTRAVENOUS ONCE
Status: COMPLETED | OUTPATIENT
Start: 2017-11-22 | End: 2017-11-22

## 2017-11-22 RX ORDER — LORAZEPAM 2 MG/ML
0.5 INJECTION INTRAMUSCULAR ONCE
Status: COMPLETED | OUTPATIENT
Start: 2017-11-22 | End: 2017-11-22

## 2017-11-22 RX ORDER — SODIUM CHLORIDE 9 MG/ML
250 INJECTION, SOLUTION INTRAVENOUS ONCE
Status: DISCONTINUED | OUTPATIENT
Start: 2017-11-22 | End: 2017-11-22 | Stop reason: HOSPADM

## 2017-11-22 RX ORDER — LORAZEPAM 2 MG/ML
0.5 INJECTION INTRAMUSCULAR ONCE
Status: CANCELLED
Start: 2017-11-22 | End: 2017-11-22

## 2017-11-22 RX ADMIN — DEXAMETHASONE SODIUM PHOSPHATE 12 MG: 4 INJECTION, SOLUTION INTRA-ARTICULAR; INTRALESIONAL; INTRAMUSCULAR; INTRAVENOUS; SOFT TISSUE at 09:55

## 2017-11-22 RX ADMIN — CYCLOPHOSPHAMIDE 1030 MG: 500 INJECTION, POWDER, FOR SOLUTION INTRAVENOUS; ORAL at 11:36

## 2017-11-22 RX ADMIN — PALONOSETRON HYDROCHLORIDE 0.25 MG: 0.25 INJECTION INTRAVENOUS at 09:40

## 2017-11-22 RX ADMIN — DOCETAXEL 130 MG: 20 INJECTION, SOLUTION, CONCENTRATE INTRAVENOUS at 10:15

## 2017-11-22 RX ADMIN — PEGFILGRASTIM 6 MG: KIT SUBCUTANEOUS at 12:25

## 2017-11-22 RX ADMIN — LORAZEPAM 0.5 MG: 2 INJECTION, SOLUTION INTRAMUSCULAR; INTRAVENOUS at 09:40

## 2017-12-05 ENCOUNTER — TELEPHONE (OUTPATIENT)
Dept: ONCOLOGY | Facility: CLINIC | Age: 67
End: 2017-12-05

## 2017-12-05 NOTE — TELEPHONE ENCOUNTER
VM left advising to monitor this, if becomes more frequent or has epistaxis that does not stop to call back. Platelets at last visit were WNL.  To call back if any further questions

## 2017-12-05 NOTE — TELEPHONE ENCOUNTER
----- Message from Betsy Russo sent at 12/5/2017 10:13 AM EST -----  Regarding: BADIN - BLOOD COMING FROM NOSE  Contact: [phone]  ESTELLA, DAUGHTER CALLED AND IS CONCERNED ABOUT MOTHERS BLOOD COUNT. WHEN SHE WAKES UP, SHE HAS A LITTLE BLOOD ON HER PILLOW, AND WHEN SHE BLOWS HER NOSE, SHELL HAVE BLOOD.

## 2017-12-08 ENCOUNTER — LAB (OUTPATIENT)
Dept: LAB | Facility: HOSPITAL | Age: 67
End: 2017-12-08
Attending: INTERNAL MEDICINE

## 2017-12-08 ENCOUNTER — TRANSCRIBE ORDERS (OUTPATIENT)
Dept: LAB | Facility: HOSPITAL | Age: 67
End: 2017-12-08

## 2017-12-08 DIAGNOSIS — C50.919 MALIGNANT NEOPLASM OF FEMALE BREAST, UNSPECIFIED ESTROGEN RECEPTOR STATUS, UNSPECIFIED LATERALITY, UNSPECIFIED SITE OF BREAST (HCC): Primary | ICD-10-CM

## 2017-12-08 DIAGNOSIS — C50.919 MALIGNANT NEOPLASM OF FEMALE BREAST, UNSPECIFIED ESTROGEN RECEPTOR STATUS, UNSPECIFIED LATERALITY, UNSPECIFIED SITE OF BREAST (HCC): ICD-10-CM

## 2017-12-08 LAB
BASOPHILS # BLD AUTO: 0.04 10*3/MM3 (ref 0–0.2)
BASOPHILS NFR BLD AUTO: 0.6 % (ref 0–2.5)
DEPRECATED RDW RBC AUTO: 56.1 FL (ref 37–54)
EOSINOPHIL # BLD AUTO: 0.04 10*3/MM3 (ref 0–0.7)
EOSINOPHIL NFR BLD AUTO: 0.6 % (ref 0–7)
ERYTHROCYTE [DISTWIDTH] IN BLOOD BY AUTOMATED COUNT: 16.1 % (ref 11.5–14.5)
HCT VFR BLD AUTO: 35.3 % (ref 37–47)
HGB BLD-MCNC: 11.3 G/DL (ref 12–16)
IMM GRANULOCYTES # BLD: 0.03 10*3/MM3 (ref 0–0.06)
IMM GRANULOCYTES NFR BLD: 0.4 % (ref 0–0.6)
LYMPHOCYTES # BLD AUTO: 1.14 10*3/MM3 (ref 0.6–3.4)
LYMPHOCYTES NFR BLD AUTO: 16.1 % (ref 10–50)
MCH RBC QN AUTO: 30.7 PG (ref 27–31)
MCHC RBC AUTO-ENTMCNC: 32 G/DL (ref 30–37)
MCV RBC AUTO: 95.9 FL (ref 81–99)
MONOCYTES # BLD AUTO: 0.7 10*3/MM3 (ref 0–0.9)
MONOCYTES NFR BLD AUTO: 9.9 % (ref 0–12)
NEUTROPHILS # BLD AUTO: 5.13 10*3/MM3 (ref 2–6.9)
NEUTROPHILS NFR BLD AUTO: 72.4 % (ref 37–80)
NRBC BLD MANUAL-RTO: 0 /100 WBC (ref 0–0)
PLATELET # BLD AUTO: 227 10*3/MM3 (ref 130–400)
PMV BLD AUTO: 9.6 FL (ref 6–12)
RBC # BLD AUTO: 3.68 10*6/MM3 (ref 4.2–5.4)
WBC NRBC COR # BLD: 7.08 10*3/MM3 (ref 4.8–10.8)

## 2017-12-08 PROCEDURE — 36415 COLL VENOUS BLD VENIPUNCTURE: CPT

## 2017-12-08 PROCEDURE — 85025 COMPLETE CBC W/AUTO DIFF WBC: CPT

## 2017-12-20 ENCOUNTER — OFFICE VISIT (OUTPATIENT)
Dept: ONCOLOGY | Facility: CLINIC | Age: 67
End: 2017-12-20

## 2017-12-20 VITALS
DIASTOLIC BLOOD PRESSURE: 81 MMHG | SYSTOLIC BLOOD PRESSURE: 137 MMHG | RESPIRATION RATE: 14 BRPM | WEIGHT: 148 LBS | HEART RATE: 78 BPM | TEMPERATURE: 97.2 F | BODY MASS INDEX: 27.51 KG/M2

## 2017-12-20 DIAGNOSIS — C50.211 MALIGNANT NEOPLASM OF UPPER-INNER QUADRANT OF RIGHT BREAST IN FEMALE, ESTROGEN RECEPTOR NEGATIVE (HCC): Primary | ICD-10-CM

## 2017-12-20 DIAGNOSIS — Z17.1 MALIGNANT NEOPLASM OF UPPER-INNER QUADRANT OF RIGHT BREAST IN FEMALE, ESTROGEN RECEPTOR NEGATIVE (HCC): Primary | ICD-10-CM

## 2017-12-20 PROCEDURE — 99214 OFFICE O/P EST MOD 30 MIN: CPT | Performed by: INTERNAL MEDICINE

## 2017-12-20 NOTE — PROGRESS NOTES
DATE OF VISIT: 12/20/2017    REASON FOR VISIT: Followup for triple negative right breast cancer [id number].      HISTORY OF PRESENT ILLNESS: The patient is a very pleasant 67 y.o. female  with past medical history significant for breast cancer diagnosed 7/5/2017. The patient presented with right breast lump.  She has been very busy taking care of her sick  who passed away May 2017.  She had bilateral mammogram May 12, 2017 that revealed 1.8 cm abnormality in the right breast at 1 o'clock position.  Ultrasound-guided biopsy done on July 5, 2017 showed invasive triple negative high grade ductal carcinoma. The patient had right breast lumpectomy with sentinel lymph node biopsy done 8/29/2017 with final pathology showing 1.7 cm high-grade ductal carcinoma, clear surgical margins, negative sentinel lymph nodes, no lymphovascular invasion, and triple negative disease.  She was started on adjuvant chemotherapy using Taxotere Cytoxan September 13, 2017.  She completed Taxotere Cytoxan Nov. 22, 2017. The patient is here today for follow up.      SUBJECTIVE: The patient was able to tolerate chemotherapy without serious side effects.  She had nausea improved with Zofran.  She had diffuse bony pain as well as fatigue.  She reported small amount of blood in the mornings on her pillow upon wakening.  This has resolved. She continues to have some anxiety. she denied any fever or chills, no night sweats, denied any headaches.  She has mild heartburn.  She is complaining of nails discoloration.    PAST MEDICAL HISTORY/SOCIAL HISTORY/FAMILY HISTORY: Unchanged from my prior documentation done on 08/07/2017.     Review of Systems   Constitutional: Negative for activity change, appetite change, chills, fatigue, fever and unexpected weight change.   HENT: Negative for hearing loss, mouth sores, nosebleeds, sore throat and trouble swallowing.    Eyes: Negative for visual disturbance.   Respiratory: Negative for cough, chest tightness,  shortness of breath and wheezing.    Cardiovascular: Negative for chest pain, palpitations and leg swelling.   Gastrointestinal: Negative for abdominal distention, abdominal pain, blood in stool, constipation, diarrhea, nausea, rectal pain and vomiting.   Endocrine: Negative for cold intolerance and heat intolerance.   Genitourinary: Negative for difficulty urinating, dysuria, frequency and urgency.   Musculoskeletal: Negative for arthralgias, back pain, gait problem, joint swelling and myalgias.   Skin: Negative for rash.   Neurological: Negative for dizziness, tremors, syncope, weakness, light-headedness, numbness and headaches.   Hematological: Negative for adenopathy. Does not bruise/bleed easily.   Psychiatric/Behavioral: Negative for confusion, sleep disturbance and suicidal ideas. The patient is not nervous/anxious.          Current Outpatient Prescriptions:   •  amLODIPine (NORVASC) 5 MG tablet, Take 2.5 mg by mouth Every Night., Disp: , Rfl:   •  atorvastatin (LIPITOR) 40 MG tablet, Take 1 tablet by mouth Daily., Disp: 30 tablet, Rfl: 11  •  cetirizine (zyrTEC) 10 MG tablet, Take 10 mg by mouth Daily., Disp: , Rfl:   •  dexamethasone (DECADRON) 4 MG tablet, Take 2 tablets in the morning the day after chemo (Day 2), then take 2 tablets twice daily on days 3 & 4.  Take with food., Disp: 10 tablet, Rfl: 3  •  DULoxetine (CYMBALTA) 20 MG capsule, Take 1 capsule by mouth Daily., Disp: 30 capsule, Rfl: 3  •  LORazepam (ATIVAN) 0.5 MG tablet, Take 1 tablet by mouth 2 (Two) Times a Day As Needed for Anxiety., Disp: 60 tablet, Rfl: 2  •  ondansetron (ZOFRAN) 8 MG tablet, Take 1 tablet by mouth 3 (Three) Times a Day As Needed for Nausea or Vomiting., Disp: 30 tablet, Rfl: 5  •  TiZANidine (ZANAFLEX) 4 MG capsule, Take 4 mg by mouth 3 (Three) Times a Day., Disp: , Rfl:   •  traMADol (ULTRAM) 50 MG tablet, Take 50 mg by mouth Every 6 (Six) Hours As Needed for Moderate Pain ., Disp: , Rfl:     PHYSICAL EXAMINATION:   BP  137/81  Pulse 78  Temp 97.2 °F (36.2 °C) (Temporal Artery )   Resp 14  Wt 67.1 kg (148 lb)  BMI 27.51 kg/m2   ECOG Performance Status: 1 - Symptomatic but completely ambulatory  General Appearance:  alert, cooperative, no apparent distress and appears stated age   Neurologic/Psychiatric: A&O x 3, gait steady, appropriate affect, strength 5/5 in all muscle groups   HEENT:  Normocephalic, without obvious abnormality, mucous membranes moist   Neck: Supple, symmetrical, trachea midline, no adenopathy;  No thyromegaly, masses, or tenderness   Lungs:   Clear to auscultation bilaterally; respirations regular, even, and unlabored bilaterally   Heart:  Regular rate and rhythm, no murmurs appreciated   Abdomen:   Soft, non-tender, non-distended and no organomegaly   Lymph nodes: No cervical, supraclavicular, inguinal or axillary adenopathy noted   Extremities: Normal, atraumatic; no clubbing, cyanosis, or edema    Skin: No rashes, ulcers, or suspicious lesions noted     Lab on 12/08/2017   Component Date Value Ref Range Status   • WBC 12/08/2017 7.08  4.80 - 10.80 10*3/mm3 Final   • RBC 12/08/2017 3.68* 4.20 - 5.40 10*6/mm3 Final   • Hemoglobin 12/08/2017 11.3* 12.0 - 16.0 g/dL Final   • Hematocrit 12/08/2017 35.3* 37.0 - 47.0 % Final   • MCV 12/08/2017 95.9  81.0 - 99.0 fL Final   • MCH 12/08/2017 30.7  27.0 - 31.0 pg Final   • MCHC 12/08/2017 32.0  30.0 - 37.0 g/dL Final   • RDW 12/08/2017 16.1* 11.5 - 14.5 % Final   • RDW-SD 12/08/2017 56.1* 37.0 - 54.0 fl Final   • MPV 12/08/2017 9.6  6.0 - 12.0 fL Final   • Platelets 12/08/2017 227  130 - 400 10*3/mm3 Final   • Neutrophil % 12/08/2017 72.4  37.0 - 80.0 % Final   • Lymphocyte % 12/08/2017 16.1  10.0 - 50.0 % Final   • Monocyte % 12/08/2017 9.9  0.0 - 12.0 % Final   • Eosinophil % 12/08/2017 0.6  0.0 - 7.0 % Final   • Basophil % 12/08/2017 0.6  0.0 - 2.5 % Final   • Immature Grans % 12/08/2017 0.4  0.0 - 0.6 % Final   • Neutrophils, Absolute 12/08/2017 5.13  2.00 -  6.90 10*3/mm3 Final   • Lymphocytes, Absolute 12/08/2017 1.14  0.60 - 3.40 10*3/mm3 Final   • Monocytes, Absolute 12/08/2017 0.70  0.00 - 0.90 10*3/mm3 Final   • Eosinophils, Absolute 12/08/2017 0.04  0.00 - 0.70 10*3/mm3 Final   • Basophils, Absolute 12/08/2017 0.04  0.00 - 0.20 10*3/mm3 Final   • Immature Grans, Absolute 12/08/2017 0.03  0.00 - 0.06 10*3/mm3 Final   • nRBC 12/08/2017 0.0  0.0 - 0.0 /100 WBC Final        No results found.    ASSESSMENT: The patient is a very pleasant 67 y.o. female  with stage I a triple negative right breast cancer.     PROBLEM LIST:  1.  Triple negative right breast cancer [id number] stage IA:  A.  Presented with self felt lump right breast  B.  Mammogram done May 23, 2017 showed 1.8 cm nodule 1:00 right breast  C.  Ultrasound-guided biopsy done on July 5, 2017 revealed triple negative high-grade ductal carcinoma  D. Status post right breast lumpectomy with clear margins and negative sentinel node biopsy done on 8/29/2017.   E. Started adjuvant chemotherapy using Taxotere Cytoxan September 13, 2017, status post 3 cycle  Completed Nov. 22, 2017.   F. Started adjuvant radiation December 12, 2017, she will complete it on December 31, 2017  2.  Hypertension  3.  Hypercholesteremia  4.  Neuropathy  5.  Insomnia  6.  Anxiety  7. Paronychia    PLAN:  1.  Patient completed Her adjuvant chemotherapy with Taxotere Cytoxan on Nov. 22, 2017.    2.  The patient will follow with me in February 2018.  3.  I will continue Ativan 0.5 mg twice a day as needed for anxiety.  4.  I will monitor patient blood work that she is in treatment including blood counts kidney function and liver enzymes.  5.  I did go over the CAT scan results with the patient and her daughter I reassured her there is no evidence of metastatic disease.  6.  Patient will benefit from adjuvant radiation after completion of chemotherapy.  7.  Patient will need to have bilateral mammogram follow-up 6 months after completion of  radiation.  8.  I will continue patient on Restoril 15 mg daily at bedtime as needed for insomnia.  9.  I will continue patient on Zofran as needed for chemotherapy-induced nausea.  10.  I asked patient to use tea tree oil to help with her nail issues.    America Grissom MD  12/20/2017   9:59 AM    Scribed for America Grissom MD by Irina CORTEZ 12/20/2017  9:59 AM  I, America Grissom MD, personally performed the services described in this documentation as scribed by the above named individual in my presence, and it is both accurate and complete.  12/20/2017  9:59 AM

## 2018-01-02 RX ORDER — TEMAZEPAM 15 MG/1
CAPSULE ORAL
Qty: 30 CAPSULE | Refills: 2 | OUTPATIENT
Start: 2018-01-02 | End: 2018-06-08

## 2018-02-07 ENCOUNTER — OFFICE VISIT (OUTPATIENT)
Dept: ONCOLOGY | Facility: CLINIC | Age: 68
End: 2018-02-07

## 2018-02-07 VITALS
BODY MASS INDEX: 27.72 KG/M2 | SYSTOLIC BLOOD PRESSURE: 146 MMHG | WEIGHT: 149.1 LBS | OXYGEN SATURATION: 98 % | DIASTOLIC BLOOD PRESSURE: 75 MMHG | HEART RATE: 69 BPM | RESPIRATION RATE: 16 BRPM | TEMPERATURE: 97.4 F

## 2018-02-07 DIAGNOSIS — C50.211 MALIGNANT NEOPLASM OF UPPER-INNER QUADRANT OF RIGHT BREAST IN FEMALE, ESTROGEN RECEPTOR NEGATIVE (HCC): Primary | ICD-10-CM

## 2018-02-07 DIAGNOSIS — Z17.1 MALIGNANT NEOPLASM OF UPPER-INNER QUADRANT OF RIGHT BREAST IN FEMALE, ESTROGEN RECEPTOR NEGATIVE (HCC): Primary | ICD-10-CM

## 2018-02-07 PROCEDURE — 99214 OFFICE O/P EST MOD 30 MIN: CPT | Performed by: INTERNAL MEDICINE

## 2018-02-07 RX ORDER — RANITIDINE 150 MG/1
150 TABLET ORAL NIGHTLY
COMMUNITY

## 2018-02-07 NOTE — PROGRESS NOTES
DATE OF VISIT: 2/7/2018    REASON FOR VISIT: Followup for triple negative right breast cancer [id number].      HISTORY OF PRESENT ILLNESS: The patient is a very pleasant 67 y.o. female  with past medical history significant for breast cancer diagnosed 7/5/2017. The patient presented with right breast lump.  She has been very busy taking care of her sick  who passed away May 2017.  She had bilateral mammogram May 12, 2017 that revealed 1.8 cm abnormality in the right breast at 1 o'clock position.  Ultrasound-guided biopsy done on July 5, 2017 showed invasive triple negative high grade ductal carcinoma. The patient had right breast lumpectomy with sentinel lymph node biopsy done 8/29/2017 with final pathology showing 1.7 cm high-grade ductal carcinoma, clear surgical margins, negative sentinel lymph nodes, no lymphovascular invasion, and triple negative disease.  She was started on adjuvant chemotherapy using Taxotere Cytoxan September 13, 2017.  She completed Taxotere Cytoxan Nov. 22, 2017. The patient is here today for follow up.      SUBJECTIVE: The patient is here today with her daughter for follow-up visit.  She continue to recover from chemotherapy as well as radiation side effects.  She is having mild dizziness as well as fatigue.  She had skin rash over the right breast that is gradually improving.  No headaches no weight loss.  Her hair is starting to grow back.    PAST MEDICAL HISTORY/SOCIAL HISTORY/FAMILY HISTORY: Unchanged from my prior documentation done on 08/07/2017.     Review of Systems   Constitutional: Positive for fatigue. Negative for activity change, appetite change, chills, fever and unexpected weight change.   HENT: Negative for hearing loss, mouth sores, nosebleeds, sore throat and trouble swallowing.    Eyes: Negative for visual disturbance.   Respiratory: Negative for cough, chest tightness, shortness of breath and wheezing.    Cardiovascular: Negative for chest pain, palpitations and  leg swelling.   Gastrointestinal: Negative for abdominal distention, abdominal pain, blood in stool, constipation, diarrhea, nausea, rectal pain and vomiting.   Endocrine: Negative for cold intolerance and heat intolerance.   Genitourinary: Negative for difficulty urinating, dysuria, frequency and urgency.   Musculoskeletal: Negative for arthralgias, back pain, gait problem, joint swelling and myalgias.   Skin: Positive for rash.   Neurological: Negative for dizziness, tremors, syncope, weakness, light-headedness, numbness and headaches.   Hematological: Negative for adenopathy. Does not bruise/bleed easily.   Psychiatric/Behavioral: Negative for confusion, sleep disturbance and suicidal ideas. The patient is not nervous/anxious.          Current Outpatient Prescriptions:   •  atorvastatin (LIPITOR) 40 MG tablet, Take 1 tablet by mouth Daily., Disp: 30 tablet, Rfl: 11  •  cetirizine (zyrTEC) 10 MG tablet, Take 10 mg by mouth Daily., Disp: , Rfl:   •  DULoxetine (CYMBALTA) 20 MG capsule, Take 1 capsule by mouth Daily. (Patient taking differently: Take 30 mg by mouth Daily.), Disp: 30 capsule, Rfl: 3  •  raNITIdine (ZANTAC) 150 MG tablet, Take 150 mg by mouth Every Night., Disp: , Rfl:   •  amLODIPine (NORVASC) 5 MG tablet, Take 2.5 mg by mouth Every Night., Disp: , Rfl:   •  dexamethasone (DECADRON) 4 MG tablet, Take 2 tablets in the morning the day after chemo (Day 2), then take 2 tablets twice daily on days 3 & 4.  Take with food., Disp: 10 tablet, Rfl: 3  •  LORazepam (ATIVAN) 0.5 MG tablet, Take 1 tablet by mouth 2 (Two) Times a Day As Needed for Anxiety., Disp: 60 tablet, Rfl: 2  •  ondansetron (ZOFRAN) 8 MG tablet, Take 1 tablet by mouth 3 (Three) Times a Day As Needed for Nausea or Vomiting., Disp: 30 tablet, Rfl: 5  •  temazepam (RESTORIL) 15 MG capsule, TAKE 1 CAPSULE BY MOUTH EVERY DAY AT BEDTIME AS NEEDED, Disp: 30 capsule, Rfl: 2  •  TiZANidine (ZANAFLEX) 4 MG capsule, Take 4 mg by mouth 3 (Three) Times a  Day., Disp: , Rfl:   •  traMADol (ULTRAM) 50 MG tablet, Take 50 mg by mouth Every 6 (Six) Hours As Needed for Moderate Pain ., Disp: , Rfl:     PHYSICAL EXAMINATION:   /75  Pulse 69  Temp 97.4 °F (36.3 °C) (Temporal Artery )   Resp 16  Wt 67.6 kg (149 lb 1.6 oz)  SpO2 98%  BMI 27.72 kg/m2   ECOG Performance Status: 1 - Symptomatic but completely ambulatory  General Appearance:  alert, cooperative, no apparent distress and appears stated age   Neurologic/Psychiatric: A&O x 3, gait steady, appropriate affect, strength 5/5 in all muscle groups   HEENT:  Normocephalic, without obvious abnormality, mucous membranes moist   Neck: Supple, symmetrical, trachea midline, no adenopathy;  No thyromegaly, masses, or tenderness   Lungs:   Clear to auscultation bilaterally; respirations regular, even, and unlabored bilaterally   Heart:  Regular rate and rhythm, no murmurs appreciated   Abdomen:   Soft, non-tender, non-distended and no organomegaly   Lymph nodes: No cervical, supraclavicular, inguinal or axillary adenopathy noted   Extremities: Normal, atraumatic; no clubbing, cyanosis, or edema    Skin: Rash over the right breast typical for radiation induced      No visits with results within 2 Week(s) from this visit.  Latest known visit with results is:    Lab on 12/08/2017   Component Date Value Ref Range Status   • WBC 12/08/2017 7.08  4.80 - 10.80 10*3/mm3 Final   • RBC 12/08/2017 3.68* 4.20 - 5.40 10*6/mm3 Final   • Hemoglobin 12/08/2017 11.3* 12.0 - 16.0 g/dL Final   • Hematocrit 12/08/2017 35.3* 37.0 - 47.0 % Final   • MCV 12/08/2017 95.9  81.0 - 99.0 fL Final   • MCH 12/08/2017 30.7  27.0 - 31.0 pg Final   • MCHC 12/08/2017 32.0  30.0 - 37.0 g/dL Final   • RDW 12/08/2017 16.1* 11.5 - 14.5 % Final   • RDW-SD 12/08/2017 56.1* 37.0 - 54.0 fl Final   • MPV 12/08/2017 9.6  6.0 - 12.0 fL Final   • Platelets 12/08/2017 227  130 - 400 10*3/mm3 Final   • Neutrophil % 12/08/2017 72.4  37.0 - 80.0 % Final   • Lymphocyte  % 12/08/2017 16.1  10.0 - 50.0 % Final   • Monocyte % 12/08/2017 9.9  0.0 - 12.0 % Final   • Eosinophil % 12/08/2017 0.6  0.0 - 7.0 % Final   • Basophil % 12/08/2017 0.6  0.0 - 2.5 % Final   • Immature Grans % 12/08/2017 0.4  0.0 - 0.6 % Final   • Neutrophils, Absolute 12/08/2017 5.13  2.00 - 6.90 10*3/mm3 Final   • Lymphocytes, Absolute 12/08/2017 1.14  0.60 - 3.40 10*3/mm3 Final   • Monocytes, Absolute 12/08/2017 0.70  0.00 - 0.90 10*3/mm3 Final   • Eosinophils, Absolute 12/08/2017 0.04  0.00 - 0.70 10*3/mm3 Final   • Basophils, Absolute 12/08/2017 0.04  0.00 - 0.20 10*3/mm3 Final   • Immature Grans, Absolute 12/08/2017 0.03  0.00 - 0.06 10*3/mm3 Final   • nRBC 12/08/2017 0.0  0.0 - 0.0 /100 WBC Final        No results found.    ASSESSMENT: The patient is a very pleasant 67 y.o. female  with stage I a triple negative right breast cancer.     PROBLEM LIST:  1.  Triple negative right breast cancer [id number] stage IA:  A.  Presented with self felt lump right breast  B.  Mammogram done May 23, 2017 showed 1.8 cm nodule 1:00 right breast  C.  Ultrasound-guided biopsy done on July 5, 2017 revealed triple negative high-grade ductal carcinoma  D. Status post right breast lumpectomy with clear margins and negative sentinel node biopsy done on 8/29/2017.   E. Started adjuvant chemotherapy using Taxotere Cytoxan September 13, 2017, status post 3 cycle  Completed Nov. 22, 2017.   F. Started adjuvant radiation December 12, 2017, she will complete it on December 31, 2017  2.  Hypertension  3.  Hypercholesteremia  4.  Neuropathy  5.  Insomnia  6.  Anxiety    PLAN:  1.  I will have the patient follow with me in 4 month with repeated blood work as well as bilateral mammograms.  2.  The patient is scheduled follow-up with Dr. LIU.  3.  I will continue Ativan 0.5 mg twice a day as needed for anxiety.  4.  I will monitor patient blood work that she is in treatment including blood counts kidney function and liver enzymes.  5.  I will  continue patient on Restoril 15 mg daily at bedtime as needed for insomnia.  6.  I will continue patient on Zofran as needed for chemotherapy-induced nausea.  7.  The patient will continue topical creams for radiation-induced dermatitis over the right breast.  America Grissom MD  2/7/2018   11:18 AM

## 2018-02-23 ENCOUNTER — TELEPHONE (OUTPATIENT)
Dept: ONCOLOGY | Facility: CLINIC | Age: 68
End: 2018-02-23

## 2018-02-23 RX ORDER — OSELTAMIVIR PHOSPHATE 75 MG/1
75 CAPSULE ORAL DAILY
Qty: 5 CAPSULE | Refills: 0 | Status: SHIPPED | OUTPATIENT
Start: 2018-02-23 | End: 2018-06-08

## 2018-02-23 NOTE — TELEPHONE ENCOUNTER
----- Message from Itzel Snow sent at 2/23/2018  8:02 AM EST -----  Regarding: [facility]-PREVENTION OF THE FLU  Contact: [phone]  [facility] patient's daughter called her  and her son have tested positive for the flu and have been given tamiflu she hasn't gotten it yet. She is worried the patient might get it as well should she be prescribed some tamiflu?

## 2018-06-08 ENCOUNTER — LAB (OUTPATIENT)
Dept: LAB | Facility: HOSPITAL | Age: 68
End: 2018-06-08

## 2018-06-08 ENCOUNTER — OFFICE VISIT (OUTPATIENT)
Dept: ONCOLOGY | Facility: CLINIC | Age: 68
End: 2018-06-08

## 2018-06-08 ENCOUNTER — HOSPITAL ENCOUNTER (OUTPATIENT)
Dept: MAMMOGRAPHY | Facility: HOSPITAL | Age: 68
Discharge: HOME | End: 2018-06-08
Admitting: INTERNAL MEDICINE

## 2018-06-08 VITALS
TEMPERATURE: 98.2 F | BODY MASS INDEX: 28.89 KG/M2 | WEIGHT: 157 LBS | RESPIRATION RATE: 16 BRPM | SYSTOLIC BLOOD PRESSURE: 140 MMHG | HEART RATE: 70 BPM | HEIGHT: 62 IN | DIASTOLIC BLOOD PRESSURE: 79 MMHG

## 2018-06-08 DIAGNOSIS — C50.211 MALIGNANT NEOPLASM OF UPPER-INNER QUADRANT OF RIGHT BREAST IN FEMALE, ESTROGEN RECEPTOR NEGATIVE (HCC): Primary | ICD-10-CM

## 2018-06-08 DIAGNOSIS — C50.211 MALIGNANT NEOPLASM OF UPPER-INNER QUADRANT OF RIGHT BREAST IN FEMALE, ESTROGEN RECEPTOR NEGATIVE (HCC): ICD-10-CM

## 2018-06-08 DIAGNOSIS — Z17.1 MALIGNANT NEOPLASM OF UPPER-INNER QUADRANT OF RIGHT BREAST IN FEMALE, ESTROGEN RECEPTOR NEGATIVE (HCC): ICD-10-CM

## 2018-06-08 DIAGNOSIS — Z17.1 MALIGNANT NEOPLASM OF UPPER-INNER QUADRANT OF RIGHT BREAST IN FEMALE, ESTROGEN RECEPTOR NEGATIVE (HCC): Primary | ICD-10-CM

## 2018-06-08 LAB
ALBUMIN SERPL-MCNC: 4.28 G/DL (ref 3.2–4.8)
ALBUMIN/GLOB SERPL: 1.5 G/DL (ref 1.5–2.5)
ALP SERPL-CCNC: 82 U/L (ref 25–100)
ALT SERPL W P-5'-P-CCNC: 39 U/L (ref 7–40)
ANION GAP SERPL CALCULATED.3IONS-SCNC: 7 MMOL/L (ref 3–11)
AST SERPL-CCNC: 38 U/L (ref 0–33)
BILIRUB SERPL-MCNC: 0.7 MG/DL (ref 0.3–1.2)
BUN BLD-MCNC: 19 MG/DL (ref 9–23)
BUN/CREAT SERPL: 24.1 (ref 7–25)
CALCIUM SPEC-SCNC: 9.4 MG/DL (ref 8.7–10.4)
CHLORIDE SERPL-SCNC: 107 MMOL/L (ref 99–109)
CO2 SERPL-SCNC: 29 MMOL/L (ref 20–31)
CREAT BLD-MCNC: 0.79 MG/DL (ref 0.6–1.3)
ERYTHROCYTE [DISTWIDTH] IN BLOOD BY AUTOMATED COUNT: 15.5 % (ref 11.3–14.5)
GFR SERPL CREATININE-BSD FRML MDRD: 72 ML/MIN/1.73
GLOBULIN UR ELPH-MCNC: 2.9 GM/DL
GLUCOSE BLD-MCNC: 103 MG/DL (ref 70–100)
HCT VFR BLD AUTO: 40.1 % (ref 34.5–44)
HGB BLD-MCNC: 12.7 G/DL (ref 11.5–15.5)
LYMPHOCYTES # BLD AUTO: 1.1 10*3/MM3 (ref 0.6–4.8)
LYMPHOCYTES NFR BLD AUTO: 29.2 % (ref 24–44)
MCH RBC QN AUTO: 29.5 PG (ref 27–31)
MCHC RBC AUTO-ENTMCNC: 31.8 G/DL (ref 32–36)
MCV RBC AUTO: 92.9 FL (ref 80–99)
MONOCYTES # BLD AUTO: 0.3 10*3/MM3 (ref 0–1)
MONOCYTES NFR BLD AUTO: 6.8 % (ref 0–12)
NEUTROPHILS # BLD AUTO: 2.4 10*3/MM3 (ref 1.5–8.3)
NEUTROPHILS NFR BLD AUTO: 64 % (ref 41–71)
PLATELET # BLD AUTO: 260 10*3/MM3 (ref 150–450)
PMV BLD AUTO: 6.6 FL (ref 6–12)
POTASSIUM BLD-SCNC: 4.4 MMOL/L (ref 3.5–5.5)
PROT SERPL-MCNC: 7.2 G/DL (ref 5.7–8.2)
RBC # BLD AUTO: 4.31 10*6/MM3 (ref 3.89–5.14)
SODIUM BLD-SCNC: 143 MMOL/L (ref 132–146)
WBC NRBC COR # BLD: 3.8 10*3/MM3 (ref 3.5–10.8)

## 2018-06-08 PROCEDURE — 36415 COLL VENOUS BLD VENIPUNCTURE: CPT

## 2018-06-08 PROCEDURE — 77066 DX MAMMO INCL CAD BI: CPT

## 2018-06-08 PROCEDURE — 80053 COMPREHEN METABOLIC PANEL: CPT

## 2018-06-08 PROCEDURE — 85025 COMPLETE CBC W/AUTO DIFF WBC: CPT

## 2018-06-08 PROCEDURE — 99214 OFFICE O/P EST MOD 30 MIN: CPT | Performed by: NURSE PRACTITIONER

## 2018-06-08 RX ORDER — MONTELUKAST SODIUM 10 MG/1
10 TABLET ORAL NIGHTLY
COMMUNITY

## 2018-06-08 RX ORDER — FLUTICASONE PROPIONATE 50 MCG
2 SPRAY, SUSPENSION (ML) NASAL DAILY
COMMUNITY

## 2018-06-08 NOTE — PROGRESS NOTES
DATE OF VISIT: 6/8/2018    REASON FOR VISIT: Followup for triple negative right breast cancer [id number].      HISTORY OF PRESENT ILLNESS: The patient is a very pleasant 68 y.o. female  with past medical history significant for breast cancer diagnosed 7/5/2017. The patient presented with right breast lump.  She has been very busy taking care of her sick  who passed away May 2017.  She had bilateral mammogram May 12, 2017 that revealed 1.8 cm abnormality in the right breast at 1 o'clock position.  Ultrasound-guided biopsy done on July 5, 2017 showed invasive triple negative high grade ductal carcinoma. The patient had right breast lumpectomy with sentinel lymph node biopsy done 8/29/2017 with final pathology showing 1.7 cm high-grade ductal carcinoma, clear surgical margins, negative sentinel lymph nodes, no lymphovascular invasion, and triple negative disease.  She was started on adjuvant chemotherapy using Taxotere Cytoxan September 13, 2017.  She completed Taxotere Cytoxan Nov. 22, 2017. The patient is here today for follow up.      SUBJECTIVE: The patient is doing fairly well. She continues to have some weakness following chemotherapy. She has participated in physical and occupational therapy with some improvement in symptoms. She is planning to return to [place] soon. She has some residual tenderness and scarring to her right breast following lumpectomy and radiation. She is anxious regarding her mammogram results from today.     PAST MEDICAL HISTORY/SOCIAL HISTORY/FAMILY HISTORY: Unchanged from my prior documentation done on 08/07/2017.     Review of Systems   Constitutional: Positive for fatigue. Negative for activity change, appetite change, chills, fever and unexpected weight change.   HENT: Negative for hearing loss, mouth sores, nosebleeds, sore throat and trouble swallowing.    Eyes: Negative for visual disturbance.   Respiratory: Negative for cough, chest tightness, shortness of breath and wheezing.   "  Cardiovascular: Negative for chest pain, palpitations and leg swelling.   Gastrointestinal: Negative for abdominal distention, abdominal pain, blood in stool, constipation, diarrhea, nausea, rectal pain and vomiting.   Endocrine: Negative for cold intolerance and heat intolerance.   Genitourinary: Negative for difficulty urinating, dysuria, frequency and urgency.   Musculoskeletal: Negative for arthralgias, back pain, gait problem, joint swelling and myalgias.   Skin:        Right breast tenderness   Neurological: Positive for weakness. Negative for dizziness, tremors, syncope, light-headedness, numbness and headaches.   Hematological: Negative for adenopathy. Does not bruise/bleed easily.   Psychiatric/Behavioral: Negative for confusion, sleep disturbance and suicidal ideas. The patient is not nervous/anxious.          Current Outpatient Prescriptions:   •  DULoxetine (CYMBALTA) 20 MG capsule, Take 1 capsule by mouth Daily. (Patient taking differently: Take 30 mg by mouth Daily.), Disp: 30 capsule, Rfl: 3  •  fluticasone (FLONASE) 50 MCG/ACT nasal spray, 2 sprays into each nostril Daily., Disp: , Rfl:   •  montelukast (SINGULAIR) 10 MG tablet, Take 10 mg by mouth Every Night., Disp: , Rfl:   •  atorvastatin (LIPITOR) 40 MG tablet, Take 1 tablet by mouth Daily., Disp: 30 tablet, Rfl: 11  •  cetirizine (zyrTEC) 10 MG tablet, Take 10 mg by mouth Daily., Disp: , Rfl:   •  raNITIdine (ZANTAC) 150 MG tablet, Take 150 mg by mouth Every Night., Disp: , Rfl:     PHYSICAL EXAMINATION:   /79   Pulse 70   Temp 98.2 °F (36.8 °C)   Resp 16   Ht 156.2 cm (61.5\")   Wt 71.2 kg (157 lb)   BMI 29.18 kg/m²    ECOG Performance Status: 1 - Symptomatic but completely ambulatory  General Appearance:  alert, cooperative, no apparent distress and appears stated age   Neurologic/Psychiatric: A&O x 3, gait steady, appropriate affect, strength 5/5 in all muscle groups   HEENT:  Normocephalic, without obvious abnormality, mucous " membranes moist   Neck: Supple, symmetrical, trachea midline, no adenopathy;  No thyromegaly, masses, or tenderness   Lungs:   Clear to auscultation bilaterally; respirations regular, even, and unlabored bilaterally   Heart:  Regular rate and rhythm, no murmurs appreciated   Abdomen:   Soft, non-tender, non-distended and no organomegaly   Lymph nodes: No cervical, supraclavicular, inguinal or axillary adenopathy noted   Extremities: Normal, atraumatic; no clubbing, cyanosis, or edema    Skin: Rash over the right breast typical for radiation induced      Lab on 06/08/2018   Component Date Value Ref Range Status   • WBC 06/08/2018 3.80  3.50 - 10.80 10*3/mm3 Final   • RBC 06/08/2018 4.31  3.89 - 5.14 10*6/mm3 Final   • Hemoglobin 06/08/2018 12.7  11.5 - 15.5 g/dL Final   • Hematocrit 06/08/2018 40.1  34.5 - 44.0 % Final   • RDW 06/08/2018 15.5* 11.3 - 14.5 % Final   • MCV 06/08/2018 92.9  80.0 - 99.0 fL Final   • MCH 06/08/2018 29.5  27.0 - 31.0 pg Final   • MCHC 06/08/2018 31.8* 32.0 - 36.0 g/dL Final   • MPV 06/08/2018 6.6  6.0 - 12.0 fL Final   • Platelets 06/08/2018 260  150 - 450 10*3/mm3 Final   • Neutrophil % 06/08/2018 64.0  41.0 - 71.0 % Final   • Lymphocyte % 06/08/2018 29.2  24.0 - 44.0 % Final   • Monocyte % 06/08/2018 6.8  0.0 - 12.0 % Final   • Neutrophils, Absolute 06/08/2018 2.40  1.50 - 8.30 10*3/mm3 Final   • Lymphocytes, Absolute 06/08/2018 1.10  0.60 - 4.80 10*3/mm3 Final   • Monocytes, Absolute 06/08/2018 0.30  0.00 - 1.00 10*3/mm3 Final        No results found.    ASSESSMENT: The patient is a very pleasant 68 y.o. female  with stage I a triple negative right breast cancer.     PROBLEM LIST:  1.  Triple negative right breast cancer [id number] stage IA:  A.  Presented with self felt lump right breast  B.  Mammogram done May 23, 2017 showed 1.8 cm nodule 1:00 right breast  C.  Ultrasound-guided biopsy done on July 5, 2017 revealed triple negative high-grade ductal carcinoma  D. Status post right breast  lumpectomy with clear margins and negative sentinel node biopsy done on 8/29/2017.   E. Started adjuvant chemotherapy using Taxotere Cytoxan September 13, 2017, status post 3 cycle  Completed Nov. 22, 2017.   F. Started adjuvant radiation December 12, 2017, she will complete it on December 31, 2017  2.  Hypertension  3.  Hypercholesteremia  4.  Neuropathy  5.  Insomnia  6.  Anxiety    PLAN:  1.  I reviewed the lab results with the patient and her daughter. I told her that her blood counts are normal. We will follow up on the remaining results and notify her of any significant findings.   2. I reviewed the mammogram results from 6/8/2018 with the patient. I told her she has benign findings with no evidence of disease recurrence.   3. We will see the patient back in 6 months with repeat labs including CBC and CMP.   4. The patient is planning to return to [place] in the next few weeks, but plans to follow up with primary physician for ongoing care.   5. She will continue physical therapy and occupational therapy for strengthening after chemotherapy.   6. She will continue follow up with Dr. LIU in December with breast exam.   7. I recommended she continue massage to her lumpectomy site for post-surgical scarring and tenderness.    Gertrude Jain, DIEGO  6/8/2018   3:16 PM

## 2024-08-12 NOTE — TELEPHONE ENCOUNTER
----- Message from Itzel Snow sent at 11/3/2017  2:34 PM EDT -----  Regarding: BADIN-FEVER  Contact: [phone]  Patient's daughter Laine called she was seen today by radiation oncologist today and she was running a fever today 100.6 had chemo last week. Please call.   Patient requests all Lab, Cardiology, and Radiology Results on their Discharge Instructions

## (undated) DEVICE — PROXIMATE RH ROTATING HEAD SKIN STAPLERS (35 WIDE) CONTAINS 35 STAINLESS STEEL STAPLES: Brand: PROXIMATE

## (undated) DEVICE — JACKSON-PRATT 100CC BULB RESERVOIR: Brand: CARDINAL HEALTH

## (undated) DEVICE — HDRST POSTIN FM CRDL TRACH SLOT 9X8X4IN

## (undated) DEVICE — GLV SURG TRIUMPH ORTHO W/ALOE PF LTX 7.5 STRL

## (undated) DEVICE — GOWN,NON-REINFORCED,SIRUS,SET IN SLV,XL: Brand: MEDLINE

## (undated) DEVICE — LEX GENERAL BREAST: Brand: MEDLINE INDUSTRIES, INC.

## (undated) DEVICE — MEDI-VAC NON-CONDUCTIVE SUCTION TUBING: Brand: CARDINAL HEALTH

## (undated) DEVICE — SUT MNCRYL PLS ANTIB UD 4/0 PS2 18IN

## (undated) DEVICE — CAMERA/LASER ARM DRAPE: Brand: DEROYAL

## (undated) DEVICE — ANTIBACTERIAL UNDYED BRAIDED (POLYGLACTIN 910), SYNTHETIC ABSORBABLE SUTURE: Brand: COATED VICRYL

## (undated) DEVICE — AIRWY 90MM NO9

## (undated) DEVICE — ELECTRD BLD EXT EDGE/INSUL 1P 4IN

## (undated) DEVICE — DISH PETRI 3.5IN MD STRL LF

## (undated) DEVICE — SUT SILK 3/0 TIES 18IN A184H

## (undated) DEVICE — NDL HYPO ECLPS SFTY 22G 1 1/2IN

## (undated) DEVICE — CANNULA,ADULT,SOFT-TOUCH,7TUBE,SC: Brand: MEDLINE

## (undated) DEVICE — SUT SILK 2/0 PS 18IN 1588H

## (undated) DEVICE — MEDI-VAC YANKAUER SUCTION HANDLE W/BULBOUS TIP: Brand: CARDINAL HEALTH

## (undated) DEVICE — DRAPE,TOP,102X53,STERILE: Brand: MEDLINE

## (undated) DEVICE — SPNG LAP PREWASH SFTPK 18X18IN 5PK STRL

## (undated) DEVICE — DEV TRANSPEC W/PERF COMP PLT

## (undated) DEVICE — ELECTRD NDL EDGE/INSUL/PFTE.787MM 2.84IN

## (undated) DEVICE — DRAIN JACKSON PRATT ROUND 15FR: Brand: CARDINAL HEALTH